# Patient Record
Sex: FEMALE | Race: WHITE | NOT HISPANIC OR LATINO | Employment: UNEMPLOYED | RURAL
[De-identification: names, ages, dates, MRNs, and addresses within clinical notes are randomized per-mention and may not be internally consistent; named-entity substitution may affect disease eponyms.]

---

## 2021-04-19 ENCOUNTER — HOSPITAL ENCOUNTER (OUTPATIENT)
Dept: RADIOLOGY | Facility: HOSPITAL | Age: 76
Discharge: HOME | End: 2021-04-19
Attending: ORTHOPAEDIC SURGERY
Payer: MEDICARE

## 2021-04-19 DIAGNOSIS — M25.511 ACUTE PAIN OF RIGHT SHOULDER: ICD-10-CM

## 2021-04-19 PROBLEM — M12.811 ROTATOR CUFF ARTHROPATHY, RIGHT: Status: ACTIVE | Noted: 2021-04-19

## 2021-04-19 PROCEDURE — 73030 X-RAY EXAM OF SHOULDER: CPT | Mod: TC,RT

## 2021-06-02 ENCOUNTER — HOSPITAL ENCOUNTER (OUTPATIENT)
Dept: RADIOLOGY | Facility: HOSPITAL | Age: 76
Discharge: HOME | End: 2021-06-02
Attending: ORTHOPAEDIC SURGERY
Payer: MEDICARE

## 2021-06-02 DIAGNOSIS — M79.671 ACUTE FOOT PAIN, RIGHT: ICD-10-CM

## 2021-06-02 PROBLEM — M72.2 PLANTAR FASCIITIS OF RIGHT FOOT: Status: ACTIVE | Noted: 2021-06-02

## 2021-06-02 PROCEDURE — 73630 X-RAY EXAM OF FOOT: CPT | Mod: TC,RT

## 2022-08-22 ENCOUNTER — HOSPITAL ENCOUNTER (OUTPATIENT)
Dept: RADIOLOGY | Facility: HOSPITAL | Age: 77
Discharge: HOME | End: 2022-08-22
Attending: ORTHOPAEDIC SURGERY
Payer: MEDICARE

## 2022-08-22 DIAGNOSIS — M25.561 RIGHT KNEE PAIN, UNSPECIFIED CHRONICITY: ICD-10-CM

## 2022-08-22 PROCEDURE — 73562 X-RAY EXAM OF KNEE 3: CPT | Mod: TC,RT

## 2023-06-13 DIAGNOSIS — M25.561 CHRONIC PAIN OF RIGHT KNEE: Primary | ICD-10-CM

## 2023-06-13 DIAGNOSIS — G89.29 CHRONIC PAIN OF RIGHT KNEE: Primary | ICD-10-CM

## 2023-06-13 DIAGNOSIS — M79.671 RIGHT FOOT PAIN: ICD-10-CM

## 2023-06-14 ENCOUNTER — HOSPITAL ENCOUNTER (OUTPATIENT)
Dept: RADIOLOGY | Facility: HOSPITAL | Age: 78
Discharge: HOME | End: 2023-06-14
Attending: NURSE PRACTITIONER
Payer: MEDICARE

## 2023-06-14 ENCOUNTER — OFFICE VISIT (OUTPATIENT)
Dept: ORTHOPEDICS | Facility: CLINIC | Age: 78
End: 2023-06-14
Payer: MEDICARE

## 2023-06-14 VITALS
BODY MASS INDEX: 48.54 KG/M2 | TEMPERATURE: 98 F | HEART RATE: 58 BPM | RESPIRATION RATE: 12 BRPM | HEIGHT: 57 IN | WEIGHT: 225 LBS | SYSTOLIC BLOOD PRESSURE: 122 MMHG | OXYGEN SATURATION: 97 % | DIASTOLIC BLOOD PRESSURE: 76 MMHG

## 2023-06-14 DIAGNOSIS — G89.29 CHRONIC PAIN OF RIGHT KNEE: ICD-10-CM

## 2023-06-14 DIAGNOSIS — M79.671 RIGHT FOOT PAIN: ICD-10-CM

## 2023-06-14 DIAGNOSIS — M25.561 CHRONIC PAIN OF RIGHT KNEE: ICD-10-CM

## 2023-06-14 DIAGNOSIS — M72.2 PLANTAR FASCIITIS: Primary | ICD-10-CM

## 2023-06-14 DIAGNOSIS — M17.11 PRIMARY OSTEOARTHRITIS OF RIGHT KNEE: ICD-10-CM

## 2023-06-14 PROCEDURE — 73564 X-RAY EXAM KNEE 4 OR MORE: CPT | Mod: TC,RT

## 2023-06-14 PROCEDURE — 73564 XR KNEE COMP 4 OR MORE VIEWS RIGHT: ICD-10-PCS | Mod: 26,RT,, | Performed by: RADIOLOGY

## 2023-06-14 PROCEDURE — 73564 X-RAY EXAM KNEE 4 OR MORE: CPT | Mod: 26,RT,, | Performed by: RADIOLOGY

## 2023-06-14 PROCEDURE — 73630 X-RAY EXAM OF FOOT: CPT | Mod: 26,RT,, | Performed by: RADIOLOGY

## 2023-06-14 PROCEDURE — 99214 OFFICE O/P EST MOD 30 MIN: CPT | Mod: PBBFAC | Performed by: NURSE PRACTITIONER

## 2023-06-14 PROCEDURE — 73630 X-RAY EXAM OF FOOT: CPT | Mod: TC,RT

## 2023-06-14 PROCEDURE — 99214 PR OFFICE/OUTPT VISIT, EST, LEVL IV, 30-39 MIN: ICD-10-PCS | Mod: S$PBB,,, | Performed by: NURSE PRACTITIONER

## 2023-06-14 PROCEDURE — 99214 OFFICE O/P EST MOD 30 MIN: CPT | Mod: S$PBB,,, | Performed by: NURSE PRACTITIONER

## 2023-06-14 PROCEDURE — 73630 XR FOOT COMPLETE 3 VIEW RIGHT: ICD-10-PCS | Mod: 26,RT,, | Performed by: RADIOLOGY

## 2023-06-14 NOTE — PROGRESS NOTES
78-year-old female presents ambulatory to orthopedic clinic complaint of pain in her right heel.  She is known to orthopedic clinic being followed for plantar fasciitis of the right heel.  She was last seen 2 at which time she had injection.  She states she had absolutely no relief with the injection.  She did find a night splint that she had been using that was helping.  She was also been using over-the-counter brace for plantar fasciitis.  States she was gotten to the point now where it is very painful to walk.  States she is not interested in any further injections.  She wishes to discuss surgical options with Dr. Ramirez.  Also complained of pain in her right knee that is intermittent.  States that Voltaren beneficial for her knee.    X-ray: X-rays today of the right knee AP lateral patellar reviewed by Dr. Ramirez.  Review of the x-ray of the right knee shows mild to moderate DJD changes.  No evidence acute fracture, dislocation or pathologic bone noted.      X-rays of the right foot AP lateral oblique view shows large calcaneal spur.  There is DJD changes 1st MTP joint.  There is no evidence acute fracture, dislocation or pathologic bone noted.      PE:  She is noted be ambulating with antalgic limp on the right.  Physical exam right knee skin is warm, dry and intact.  No soft tissue swelling noted.  No intra-articular extension further flexion approximately 115°.  There was excellent ligamentous balance instability noted clinically.  Physical exam right foot skin is warm dry and intact.  No soft tissue swelling is noted.  There is point tenderness over the plantar fascia at the calcaneus.  Pedal pulse 2/4.  Capillary refill digits right foot 3 seconds.      Impression:  1.)  Plantar fasciitis-right foot 2.) DJD right knee    Plan:  Safety guidelines activity restrictions cusp patient.  Verbalized understanding.  We will have this patient follow-up Dr. Ramirez discussed treatment options for both her  knee and plantar fasciitis or sooner as needed.

## 2023-06-14 NOTE — PATIENT INSTRUCTIONS
Safety guidelines activity restrictions cusp patient.  Verbalized understanding.  We will have this patient follow-up Dr. Ramirez discussed treatment options for both her knee and plantar fasciitis or sooner as needed

## 2023-06-30 ENCOUNTER — OFFICE VISIT (OUTPATIENT)
Dept: ORTHOPEDICS | Facility: CLINIC | Age: 78
End: 2023-06-30
Payer: MEDICARE

## 2023-06-30 DIAGNOSIS — M72.2 PLANTAR FASCIITIS OF RIGHT FOOT: Primary | ICD-10-CM

## 2023-06-30 PROCEDURE — 20551 PR INJECT TENDON ORIGIN/INSERT: ICD-10-PCS | Mod: S$PBB,RT,, | Performed by: ORTHOPAEDIC SURGERY

## 2023-06-30 PROCEDURE — 99213 OFFICE O/P EST LOW 20 MIN: CPT | Mod: PBBFAC | Performed by: ORTHOPAEDIC SURGERY

## 2023-06-30 PROCEDURE — 99214 PR OFFICE/OUTPT VISIT, EST, LEVL IV, 30-39 MIN: ICD-10-PCS | Mod: S$PBB,25,, | Performed by: ORTHOPAEDIC SURGERY

## 2023-06-30 PROCEDURE — 20551 NJX 1 TENDON ORIGIN/INSJ: CPT | Mod: PBBFAC,RT | Performed by: ORTHOPAEDIC SURGERY

## 2023-06-30 PROCEDURE — 99214 OFFICE O/P EST MOD 30 MIN: CPT | Mod: S$PBB,25,, | Performed by: ORTHOPAEDIC SURGERY

## 2023-06-30 PROCEDURE — 20605 DRAIN/INJ JOINT/BURSA W/O US: CPT | Mod: PBBFAC | Performed by: ORTHOPAEDIC SURGERY

## 2023-06-30 PROCEDURE — 20551 NJX 1 TENDON ORIGIN/INSJ: CPT | Mod: S$PBB,RT,, | Performed by: ORTHOPAEDIC SURGERY

## 2023-06-30 RX ORDER — BUPIVACAINE HYDROCHLORIDE 2.5 MG/ML
1 INJECTION, SOLUTION INFILTRATION; PERINEURAL
Status: COMPLETED | OUTPATIENT
Start: 2023-06-30 | End: 2023-06-30

## 2023-06-30 RX ORDER — TRIAMCINOLONE ACETONIDE 40 MG/ML
40 INJECTION, SUSPENSION INTRA-ARTICULAR; INTRAMUSCULAR
Status: COMPLETED | OUTPATIENT
Start: 2023-06-30 | End: 2023-06-30

## 2023-06-30 RX ADMIN — TRIAMCINOLONE ACETONIDE 40 MG: 40 INJECTION, SUSPENSION INTRA-ARTICULAR; INTRAMUSCULAR at 01:06

## 2023-06-30 RX ADMIN — BUPIVACAINE HYDROCHLORIDE 2.5 MG: 2.5 INJECTION, SOLUTION INFILTRATION; PERINEURAL at 01:06

## 2023-06-30 NOTE — PROGRESS NOTES
CLINIC NOTE       Chief Complaint   Patient presents with    Right Foot - Pain        Desire Worrell is a 78 y.o. female seen today for evaluation of right heel pain.  Symptoms began insidiously 2 years ago.  No history of trauma.  She was diagnosed as having plantar fasciitis.  She had 1 corticosteroid injection in the heel region given by Avtar DUENAS 2 years ago.  She is wearing [employer] shoes.  She does not have to his heel cups.      History reviewed. No pertinent past medical history.  History reviewed. No pertinent family history.  Current Outpatient Medications on File Prior to Visit   Medication Sig Dispense Refill    furosemide (LASIX) 20 MG tablet Take 20 mg by mouth.      levothyroxine (SYNTHROID) 75 MCG tablet Take 75 mcg by mouth.      omeprazole-sodium bicarbonate 20-1.1 mg-gram Cap Take 20 mg by mouth.      potassium chloride (KLOR-CON) 10 MEQ TbSR Take 10 mEq by mouth once daily.       No current facility-administered medications on file prior to visit.       ROS     There were no vitals filed for this visit.    History reviewed. No pertinent surgical history.     Review of patient's allergies indicates:   Allergen Reactions    Prednisone Other (See Comments)     UNKNOWN  C/O Weakness          Ortho Exam :  Well-developed well-nourished  female no acute distress.  Alert oriented cooperative.  Neck is supple without JVD.  Breathing is regular nonlabored.  Skin is warm dry no lesions seen.  Exam of the right foot shows flatfoot with loss of longitudinal arch.  He was moderate clawtoe deformities of the lesser toes.  There is exquisite tenderness palpation over the plantar fascia origin of the os calcis reproducing symptoms.  Ankle ligaments stable clinically.    Radiographic Examination:  Right foot 06/30/2023    Technique:  Three views AP lateral oblique    Findings:  Bones well mineralized.  As loss of longitudinal arch.  Traction spur emanates from the plantar fascia origin of the  os calcis.  Moderate clawtoe deformities and hallux rigidus.    Impression:   See Above    Assessment and Plan  Patient Active Problem List    Diagnosis Date Noted    Primary osteoarthritis of right knee 06/14/2023    Plantar fasciitis 06/02/2021    Rotator cuff arthropathy, right 04/19/2021    Impression:  Plantar fasciitis-right foot   Plan:  The right foot was prepped with Betadine plantar fascia origin of the os calcis injected with triamcinolone and Marcaine 1 cc each.  Two left heel cups issued.  Recheck p.r.n. if symptoms persist or worsen.      Pablo Ramirez M.D.

## 2023-09-22 DIAGNOSIS — M25.512 ACUTE PAIN OF LEFT SHOULDER: Primary | ICD-10-CM

## 2023-09-25 ENCOUNTER — HOSPITAL ENCOUNTER (OUTPATIENT)
Dept: RADIOLOGY | Facility: HOSPITAL | Age: 78
Discharge: HOME | End: 2023-09-25
Attending: NURSE PRACTITIONER
Payer: MEDICARE

## 2023-09-25 ENCOUNTER — OFFICE VISIT (OUTPATIENT)
Dept: ORTHOPEDICS | Facility: CLINIC | Age: 78
End: 2023-09-25
Payer: MEDICARE

## 2023-09-25 VITALS
SYSTOLIC BLOOD PRESSURE: 124 MMHG | WEIGHT: 225 LBS | DIASTOLIC BLOOD PRESSURE: 76 MMHG | RESPIRATION RATE: 16 BRPM | TEMPERATURE: 98 F | BODY MASS INDEX: 48.54 KG/M2 | HEIGHT: 57 IN | OXYGEN SATURATION: 99 % | HEART RATE: 69 BPM

## 2023-09-25 DIAGNOSIS — M75.42 SHOULDER IMPINGEMENT SYNDROME, LEFT: ICD-10-CM

## 2023-09-25 DIAGNOSIS — M25.512 CHRONIC LEFT SHOULDER PAIN: Primary | ICD-10-CM

## 2023-09-25 DIAGNOSIS — M25.512 ACUTE PAIN OF LEFT SHOULDER: ICD-10-CM

## 2023-09-25 DIAGNOSIS — G89.29 CHRONIC LEFT SHOULDER PAIN: Primary | ICD-10-CM

## 2023-09-25 PROCEDURE — 73030 X-RAY EXAM OF SHOULDER: CPT | Mod: 26,LT,, | Performed by: RADIOLOGY

## 2023-09-25 PROCEDURE — 20610 DRAIN/INJ JOINT/BURSA W/O US: CPT | Mod: PBBFAC,LT | Performed by: NURSE PRACTITIONER

## 2023-09-25 PROCEDURE — 99214 OFFICE O/P EST MOD 30 MIN: CPT | Mod: S$PBB,25,, | Performed by: NURSE PRACTITIONER

## 2023-09-25 PROCEDURE — 73030 XR SHOULDER COMPLETE 2 OR MORE VIEWS LEFT: ICD-10-PCS | Mod: 26,LT,, | Performed by: RADIOLOGY

## 2023-09-25 PROCEDURE — 99999PBSHW PR PBB SHADOW TECHNICAL ONLY FILED TO HB: Mod: PBBFAC,,,

## 2023-09-25 PROCEDURE — 99999PBSHW PR PBB SHADOW TECHNICAL ONLY FILED TO HB: ICD-10-PCS | Mod: PBBFAC,,,

## 2023-09-25 PROCEDURE — 20610 LARGE JOINT ASPIRATION/INJECTION: L SUBACROMIAL BURSA: ICD-10-PCS | Mod: S$PBB,LT,, | Performed by: NURSE PRACTITIONER

## 2023-09-25 PROCEDURE — 99214 PR OFFICE/OUTPT VISIT, EST, LEVL IV, 30-39 MIN: ICD-10-PCS | Mod: S$PBB,25,, | Performed by: NURSE PRACTITIONER

## 2023-09-25 PROCEDURE — 99214 OFFICE O/P EST MOD 30 MIN: CPT | Mod: PBBFAC,25 | Performed by: NURSE PRACTITIONER

## 2023-09-25 PROCEDURE — 73030 X-RAY EXAM OF SHOULDER: CPT | Mod: TC,LT

## 2023-09-25 RX ORDER — TRIAMCINOLONE ACETONIDE 40 MG/ML
40 INJECTION, SUSPENSION INTRA-ARTICULAR; INTRAMUSCULAR
Status: DISCONTINUED | OUTPATIENT
Start: 2023-09-25 | End: 2023-09-25 | Stop reason: HOSPADM

## 2023-09-25 RX ORDER — BUPIVACAINE HYDROCHLORIDE 2.5 MG/ML
1 INJECTION, SOLUTION EPIDURAL; INFILTRATION; INTRACAUDAL
Status: DISCONTINUED | OUTPATIENT
Start: 2023-09-25 | End: 2023-09-25 | Stop reason: HOSPADM

## 2023-09-25 RX ADMIN — TRIAMCINOLONE ACETONIDE 40 MG: 40 INJECTION, SUSPENSION INTRA-ARTICULAR; INTRAMUSCULAR at 08:09

## 2023-09-25 RX ADMIN — BUPIVACAINE HYDROCHLORIDE 1 ML: 2.5 INJECTION, SOLUTION EPIDURAL; INFILTRATION; INTRACAUDAL at 08:09

## 2023-09-25 NOTE — PROCEDURES
Large Joint Aspiration/Injection: L subacromial bursa    Date/Time: 9/25/2023 8:30 AM    Performed by: Avtar Cassidy FNP  Authorized by: Avtar Cassidy FNP    Indications:  Pain  Site marked: the procedure site was marked    Timeout: prior to procedure the correct patient, procedure, and site was verified    Local anesthesia used?: No      Details:  Needle Size:  25 G  Ultrasonic Guidance for needle placement?: No    Approach:  Posterior  Location:  Shoulder  Site:  L subacromial bursa  Medications:  1 mL BUPivacaine (PF) 0.25% (2.5 mg/ml) 0.25 % (2.5 mg/mL); 40 mg triamcinolone acetonide 40 mg/mL  Patient tolerance:  Patient tolerated the procedure well with no immediate complications     Left shoulder prepped with Betadine

## 2023-09-25 NOTE — PATIENT INSTRUCTIONS
Safety guidelines and activity restrictions are discussed with the patient.  She verbalizes understanding.  We will schedule MRI examination left shoulder have return orthopedic clinic with results.  May continue use a leave per label instructions.  May return orthopedic clinic sooner as needed.

## 2023-09-25 NOTE — PROGRESS NOTES
78-year-old female presents ambulatory orthopedic clinic valuation of pain in her non dominant left shoulder.  She relates a history that on July 4, 2023 she fell face forward.  She began to have mild discomfort in her left shoulder that increased over the next 2 weeks.  It is gotten to a point now where it is excruciating.  She does have a history of a previous left shoulder arthroscopy.  States she did have a tear in her rotator cuff at that time.  States it was repaired by Dr. Ramirez.  Also relates a history of previous rotator cuff repair of the right shoulder in the remote past by Dr. Ramirez.  She was used over-the-counter leave without much relief of symptoms.  States she has pain with attempted motion of her left shoulder.      X-ray: X-rays today left shoulder AP, lateral and Y scapular view shows no evidence of acute fracture, dislocation or pathologic bone.  AC joint osteophyte noted.      PE:  Patient is noted be ambulating independent no perceptible limp.  Physical exam of her non dominant left shoulder shows the shoulder have normal contour.  Passive range of motion left shoulder 90° abduction however this is very painful for the patient.  Hernandez-Syd test reproduces pain symptoms.  Neer test is mildly positive upon raising but significantly positive when lowering the arm.  Empty can test is positive for both weakness and discomfort.  Active range motion left shoulder approximately 70° abduction independent scapulothoracic motion.  She is unable to get her hand to the side of her head.  She is able to internally rotate small of her back.  Left radial pulse 2/4.  Capillary refill all digits left hand less than 3 seconds.      Impression:  Impingement syndrome shoulder-left     Plan:  Safety guidelines and activity restrictions are discussed with the patient.  She verbalizes understanding.  She is offered subacromial steroid injection left shoulder wished proceed.  Left shoulder prepped with  Betadine.  Subacromial triamcinolone 40 mg and 0.25% bupivacaine 1 cc instilled without difficulty.  We will schedule MRI examination left shoulder have return orthopedic clinic with results.  May continue use a leave per label instructions.  May return orthopedic clinic sooner as needed.

## 2023-09-26 ENCOUNTER — HOSPITAL ENCOUNTER (OUTPATIENT)
Dept: RADIOLOGY | Facility: HOSPITAL | Age: 78
Discharge: HOME | End: 2023-09-26
Attending: NURSE PRACTITIONER
Payer: MEDICARE

## 2023-09-26 DIAGNOSIS — G89.29 CHRONIC LEFT SHOULDER PAIN: ICD-10-CM

## 2023-09-26 DIAGNOSIS — M25.512 CHRONIC LEFT SHOULDER PAIN: ICD-10-CM

## 2023-09-26 PROCEDURE — 73221 MRI SHOULDER WITHOUT CONTRAST LEFT: ICD-10-PCS | Mod: 26,LT,, | Performed by: STUDENT IN AN ORGANIZED HEALTH CARE EDUCATION/TRAINING PROGRAM

## 2023-09-26 PROCEDURE — 73221 MRI JOINT UPR EXTREM W/O DYE: CPT | Mod: TC,LT

## 2023-09-26 PROCEDURE — 73221 MRI JOINT UPR EXTREM W/O DYE: CPT | Mod: 26,LT,, | Performed by: STUDENT IN AN ORGANIZED HEALTH CARE EDUCATION/TRAINING PROGRAM

## 2023-09-27 ENCOUNTER — TELEPHONE (OUTPATIENT)
Dept: ORTHOPEDICS | Facility: CLINIC | Age: 78
End: 2023-09-27
Payer: MEDICARE

## 2023-09-27 NOTE — TELEPHONE ENCOUNTER
----- Message from Gianna Almendarez sent at 9/27/2023 10:29 AM CDT -----  Regarding: Result  Patient is calling for MRI results. Please call patient at [phone]. Thanks!

## 2023-09-27 NOTE — TELEPHONE ENCOUNTER
9/27/23 called and spoke with pt. DAP has reviewed mri results and wants to see pt in clinic pt agrees appt made

## 2023-10-02 ENCOUNTER — OFFICE VISIT (OUTPATIENT)
Dept: ORTHOPEDICS | Facility: CLINIC | Age: 78
End: 2023-10-02
Payer: MEDICARE

## 2023-10-02 DIAGNOSIS — M25.512 ACUTE PAIN OF LEFT SHOULDER: Primary | ICD-10-CM

## 2023-10-02 DIAGNOSIS — M75.42 SHOULDER IMPINGEMENT SYNDROME, LEFT: ICD-10-CM

## 2023-10-02 PROCEDURE — 99214 PR OFFICE/OUTPT VISIT, EST, LEVL IV, 30-39 MIN: ICD-10-PCS | Mod: S$PBB,,, | Performed by: ORTHOPAEDIC SURGERY

## 2023-10-02 PROCEDURE — 99214 OFFICE O/P EST MOD 30 MIN: CPT | Mod: S$PBB,,, | Performed by: ORTHOPAEDIC SURGERY

## 2023-10-02 PROCEDURE — 99213 OFFICE O/P EST LOW 20 MIN: CPT | Mod: PBBFAC | Performed by: ORTHOPAEDIC SURGERY

## 2023-10-02 NOTE — PROGRESS NOTES
CLINIC NOTE       Chief Complaint   Patient presents with    Results        Desire Worrell is a 78 y.o. female seen today for evaluation of left shoulder injury/pain.  She reportedly fell while leaving a restaurant on July 4th of this year.  Attempted to break her fall on her outstretched left nondominant hand.  She is had persistent left shoulder pain.  Her pain restrict her abilities to abduct her arm.  She saw Avtar DUENAS and had a subacromial steroid injection without relief of symptoms.  She had right shoulder arthroscopy proximally 5 years ago in left shoulder arthroscopy 20 years ago X-rays of the right shoulder 09/25/2023 shows the glenohumeral joint to be located.  There is some irregularity involving the greater tuberosity region of the right shoulder but no evidence of acute fracture, dislocation or pathologic bone.  An MRI examination of the left shoulder was conducted on 09/26/2023 joint partial-thickness tearing of the supraspinatus and infraspinatus tendons.  Partial-thickness tearing of the subscapularis tendon.  There was full-thickness cartilage thinning of the glenohumeral joint.  Appears to be a vertical split tear involving the long head biceps tendon  No past medical history on file.  No family history on file.  Current Outpatient Medications on File Prior to Visit   Medication Sig Dispense Refill    furosemide (LASIX) 20 MG tablet Take 20 mg by mouth.      levothyroxine (SYNTHROID) 75 MCG tablet Take 75 mcg by mouth.      omeprazole-sodium bicarbonate 20-1.1 mg-gram Cap Take 20 mg by mouth.      potassium chloride (KLOR-CON) 10 MEQ TbSR Take 10 mEq by mouth once daily.       No current facility-administered medications on file prior to visit.       ROS     There were no vitals filed for this visit.    No past surgical history on file.     Review of patient's allergies indicates:   Allergen Reactions    Prednisone Other (See Comments)     UNKNOWN  C/O Weakness          Ortho Exam:   Well-developed well-nourished white female no acute distress.  She is alert oriented cooperative.  Neck is supple without JVD.  Breathing is regular nonlabored.  Skin is warm and dry no lesions seen.  Exam left shoulder is normal contour.  She resists active and passive shoulder abduction beyond 70°.  No crepitance or instability signs appreciated.    Radiographic Examination:    Technique:    Findings:    Impression:   See Above    Assessment and Plan  Patient Active Problem List    Diagnosis Date Noted    Chronic left shoulder pain 09/25/2023    Shoulder impingement syndrome, left 09/25/2023    Primary osteoarthritis of right knee 06/14/2023    Plantar fasciitis of right foot 06/02/2021    Rotator cuff arthropathy, right 04/19/2021    Impression:  Left shoulder impingement/mild-to-moderate glenohumeral joint DJD.  Plan: Patient offered left shoulder arthroscopic subacromial decompression and debridement.  Potential benefits risks surgery outlined to include but not limited to bleeding, infection, damage to blood vessels nerves, need for further surgery, other risks complications including even death the patient wished to proceed  Pablo Ramirez M.D.

## 2023-10-11 NOTE — SUBJECTIVE & OBJECTIVE
History reviewed. No pertinent past medical history.    History reviewed. No pertinent surgical history.    Review of patient's allergies indicates:   Allergen Reactions    Prednisone Other (See Comments)     UNKNOWN  C/O Weakness         No current facility-administered medications for this encounter.     Current Outpatient Medications   Medication Sig    furosemide (LASIX) 20 MG tablet Take 20 mg by mouth.    levothyroxine (SYNTHROID) 75 MCG tablet Take 75 mcg by mouth.    omeprazole-sodium bicarbonate 20-1.1 mg-gram Cap Take 20 mg by mouth.    potassium chloride (KLOR-CON) 10 MEQ TbSR Take 10 mEq by mouth once daily.     Family History    None       Tobacco Use    Smoking status: Never    Smokeless tobacco: Never   Substance and Sexual Activity    Alcohol use: Not Currently    Drug use: Not Currently    Sexual activity: Not on file     Review of Systems   Constitutional: Negative.     Objective:     Vital Signs (Most Recent):    Vital Signs (24h Range):  BP: ()/()   Arterial Line BP: ()/()            There is no height or weight on file to calculate BMI.    No intake or output data in the 24 hours ending 10/11/23 1810     General    Vitals reviewed.  Constitutional: She is oriented to person, place, and time. She appears well-developed and well-nourished.   HENT:   Head: Normocephalic and atraumatic.   Eyes: EOM are normal. Pupils are equal, round, and reactive to light.   Neck: Neck supple.   Cardiovascular:  Normal rate, regular rhythm and normal heart sounds.            Pulmonary/Chest: Effort normal and breath sounds normal.   Abdominal: Soft. Bowel sounds are normal.   Neurological: She is alert and oriented to person, place, and time.   Psychiatric: She has a normal mood and affect. Her behavior is normal.         Right Shoulder Exam   Right shoulder exam is normal.    Left Shoulder Exam     Tenderness   The patient is tender to palpation of the acromioclavicular joint, acromion and greater  "tuberosity.    Range of Motion   Active abduction:  normal   Passive abduction:  normal   Extension:  normal   Forward Flexion:  normal   Adduction: normal    Muscle Strength   The patient has normal left shoulder strength.    Tests & Signs   Cross arm: positive  Impingement: positive  Rotator Cuff Painful Arc/Range: moderate    Other   Sensation: normal       Vascular Exam       Left Pulses      Radial:                    2+      Capillary Refill  Left Hand: normal capillary refill           Significant Labs: Troponin: No results for input(s): "TROPONINI" in the last 48 hours.  All pertinent labs within the past 24 hours have been reviewed.    Significant Imaging: I have reviewed all pertinent imaging results/findings.  "

## 2023-10-11 NOTE — H&P
[facility]  Orthopedics  H&P    Patient Name: Desire Worrell  MRN: [id number]  Admission Date: (Not on file)  Primary Care Provider: Hilario Thomas DO    Patient information was obtained from patient and ER records.     Subjective:     Principal Problem:Shoulder impingement syndrome, left    Chief Complaint: No chief complaint on file.       HPI: Chief complaint:  Left shoulder pain   History:   Desire Worrell is a 78 y.o. female seen today evaluation of left shoulder injury/pain.  She reportedly fell while leaving a restaurant on July 4th of this year.  Attempted to break her fall on her outstretched left nondominant hand.  She is had persistent left shoulder pain.  Her pain restrict her abilities to abduct her arm.  She saw Avtar DUENAS and had a subacromial steroid injection without relief of symptoms.  She had right shoulder arthroscopy proximally 5 years ago in left shoulder arthroscopy 20 years ago X-rays of the right shoulder 09/25/2023 shows the glenohumeral joint to be located.  There is some irregularity involving the greater tuberosity region of the right shoulder but no evidence of acute fracture, dislocation or pathologic bone.  An MRI examination of the left shoulder was conducted on 09/26/2023 joint partial-thickness tearing of the supraspinatus and infraspinatus tendons.  Partial-thickness tearing of the subscapularis tendon.  There was full-thickness cartilage thinning of the glenohumeral joint.  Appears to be a vertical split tear involving the long head biceps tendon  Impression: Impingement syndrome left shoulder   Plan:  Left shoulder arthroscopic subacromial decompression.  Outpatient general/regional block anesthesia.  Potential benefits and risks of surgery outlined to include but not limited to bleeding, infection, damage to blood vessels and nerves, need for further surgery, other risks and complications including even death the patient wished to  proceed.      History reviewed. No pertinent past medical history.    History reviewed. No pertinent surgical history.    Review of patient's allergies indicates:   Allergen Reactions    Prednisone Other (See Comments)     UNKNOWN  C/O Weakness         No current facility-administered medications for this encounter.     Current Outpatient Medications   Medication Sig    furosemide (LASIX) 20 MG tablet Take 20 mg by mouth.    levothyroxine (SYNTHROID) 75 MCG tablet Take 75 mcg by mouth.    omeprazole-sodium bicarbonate 20-1.1 mg-gram Cap Take 20 mg by mouth.    potassium chloride (KLOR-CON) 10 MEQ TbSR Take 10 mEq by mouth once daily.     Family History    None       Tobacco Use    Smoking status: Never    Smokeless tobacco: Never   Substance and Sexual Activity    Alcohol use: Not Currently    Drug use: Not Currently    Sexual activity: Not on file     Review of Systems   Constitutional: Negative.     Objective:     Vital Signs (Most Recent):    Vital Signs (24h Range):  BP: ()/()   Arterial Line BP: ()/()            There is no height or weight on file to calculate BMI.    No intake or output data in the 24 hours ending 10/11/23 1810     General    Vitals reviewed.  Constitutional: She is oriented to person, place, and time. She appears well-developed and well-nourished.   HENT:   Head: Normocephalic and atraumatic.   Eyes: EOM are normal. Pupils are equal, round, and reactive to light.   Neck: Neck supple.   Cardiovascular:  Normal rate, regular rhythm and normal heart sounds.            Pulmonary/Chest: Effort normal and breath sounds normal.   Abdominal: Soft. Bowel sounds are normal.   Neurological: She is alert and oriented to person, place, and time.   Psychiatric: She has a normal mood and affect. Her behavior is normal.         Right Shoulder Exam   Right shoulder exam is normal.    Left Shoulder Exam     Tenderness   The patient is tender to palpation of the acromioclavicular joint, acromion and  "greater tuberosity.    Range of Motion   Active abduction:  normal   Passive abduction:  normal   Extension:  normal   Forward Flexion:  normal   Adduction: normal    Muscle Strength   The patient has normal left shoulder strength.    Tests & Signs   Cross arm: positive  Impingement: positive  Rotator Cuff Painful Arc/Range: moderate    Other   Sensation: normal       Vascular Exam       Left Pulses      Radial:                    2+      Capillary Refill  Left Hand: normal capillary refill           Significant Labs: Troponin: No results for input(s): "TROPONINI" in the last 48 hours.  All pertinent labs within the past 24 hours have been reviewed.    Significant Imaging: I have reviewed all pertinent imaging results/findings.    Assessment/Plan:     No notes have been filed under this hospital service.  Service: Orthopedic Surgery      Pablo Ramirez MD  Orthopedics  [facility] - Orthopedic Periop Services    "

## 2023-10-11 NOTE — HPI
Chief complaint:  Left shoulder pain   History:   Desire Worrell is a 78 y.o. female seen today evaluation of left shoulder injury/pain.  She reportedly fell while leaving a restaurant on July 4th of this year.  Attempted to break her fall on her outstretched left nondominant hand.  She is had persistent left shoulder pain.  Her pain restrict her abilities to abduct her arm.  She saw Avtar DUENAS and had a subacromial steroid injection without relief of symptoms.  She had right shoulder arthroscopy proximally 5 years ago in left shoulder arthroscopy 20 years ago X-rays of the right shoulder 09/25/2023 shows the glenohumeral joint to be located.  There is some irregularity involving the greater tuberosity region of the right shoulder but no evidence of acute fracture, dislocation or pathologic bone.  An MRI examination of the left shoulder was conducted on 09/26/2023 joint partial-thickness tearing of the supraspinatus and infraspinatus tendons.  Partial-thickness tearing of the subscapularis tendon.  There was full-thickness cartilage thinning of the glenohumeral joint.  Appears to be a vertical split tear involving the long head biceps tendon  Impression: Impingement syndrome left shoulder   Plan:  Left shoulder arthroscopic subacromial decompression.  Outpatient general/regional block anesthesia.  Potential benefits and risks of surgery outlined to include but not limited to bleeding, infection, damage to blood vessels and nerves, need for further surgery, other risks and complications including even death the patient wished to proceed.

## 2023-10-12 ENCOUNTER — HOSPITAL ENCOUNTER (OUTPATIENT)
Facility: HOSPITAL | Age: 78
Discharge: HOME | End: 2023-10-12
Attending: ORTHOPAEDIC SURGERY | Admitting: ORTHOPAEDIC SURGERY
Payer: MEDICARE

## 2023-10-12 ENCOUNTER — ANESTHESIA (OUTPATIENT)
Dept: SURGERY | Facility: HOSPITAL | Age: 78
End: 2023-10-12
Payer: MEDICARE

## 2023-10-12 ENCOUNTER — ANESTHESIA EVENT (OUTPATIENT)
Dept: SURGERY | Facility: HOSPITAL | Age: 78
End: 2023-10-12
Payer: MEDICARE

## 2023-10-12 VITALS
WEIGHT: 215 LBS | SYSTOLIC BLOOD PRESSURE: 150 MMHG | HEART RATE: 67 BPM | OXYGEN SATURATION: 94 % | TEMPERATURE: 98 F | HEIGHT: 57 IN | BODY MASS INDEX: 46.38 KG/M2 | DIASTOLIC BLOOD PRESSURE: 67 MMHG | RESPIRATION RATE: 14 BRPM

## 2023-10-12 DIAGNOSIS — M75.42 SHOULDER IMPINGEMENT SYNDROME, LEFT: Primary | ICD-10-CM

## 2023-10-12 LAB
GLUCOSE SERPL-MCNC: 101 MG/DL (ref 70–105)
GLUCOSE SERPL-MCNC: 88 MG/DL (ref 70–105)

## 2023-10-12 PROCEDURE — 29824 PR SHLDR ARTHROSCOP,SURG,DIS CLAVICULECTOMY: ICD-10-PCS | Mod: 51,LT,, | Performed by: ORTHOPAEDIC SURGERY

## 2023-10-12 PROCEDURE — 71000015 HC POSTOP RECOV 1ST HR: Performed by: ORTHOPAEDIC SURGERY

## 2023-10-12 PROCEDURE — 25000003 PHARM REV CODE 250: Performed by: ORTHOPAEDIC SURGERY

## 2023-10-12 PROCEDURE — D9220A PRA ANESTHESIA: Mod: ANES,,, | Performed by: ANESTHESIOLOGY

## 2023-10-12 PROCEDURE — 29826 SHO ARTHRS SRG DECOMPRESSION: CPT | Mod: LT,,, | Performed by: ORTHOPAEDIC SURGERY

## 2023-10-12 PROCEDURE — 29826 PR SHLDR ARTHROSCOP,PART ACROMIOPLAS: ICD-10-PCS | Mod: LT,,, | Performed by: ORTHOPAEDIC SURGERY

## 2023-10-12 PROCEDURE — 36000710: Performed by: ORTHOPAEDIC SURGERY

## 2023-10-12 PROCEDURE — 71000033 HC RECOVERY, INTIAL HOUR: Performed by: ORTHOPAEDIC SURGERY

## 2023-10-12 PROCEDURE — 27201423 OPTIME MED/SURG SUP & DEVICES STERILE SUPPLY: Performed by: ORTHOPAEDIC SURGERY

## 2023-10-12 PROCEDURE — 29827 SHO ARTHRS SRG RT8TR CUF RPR: CPT | Mod: LT,,, | Performed by: ORTHOPAEDIC SURGERY

## 2023-10-12 PROCEDURE — 63600175 PHARM REV CODE 636 W HCPCS: Performed by: NURSE ANESTHETIST, CERTIFIED REGISTERED

## 2023-10-12 PROCEDURE — 29824 SHO ARTHRS SRG DSTL CLAVICLC: CPT | Mod: 51,LT,, | Performed by: ORTHOPAEDIC SURGERY

## 2023-10-12 PROCEDURE — D9220A PRA ANESTHESIA: Mod: CRNA,,, | Performed by: NURSE ANESTHETIST, CERTIFIED REGISTERED

## 2023-10-12 PROCEDURE — 37000008 HC ANESTHESIA 1ST 15 MINUTES: Performed by: ORTHOPAEDIC SURGERY

## 2023-10-12 PROCEDURE — 25000003 PHARM REV CODE 250: Performed by: NURSE ANESTHETIST, CERTIFIED REGISTERED

## 2023-10-12 PROCEDURE — 36000711: Performed by: ORTHOPAEDIC SURGERY

## 2023-10-12 PROCEDURE — D9220A PRA ANESTHESIA: ICD-10-PCS | Mod: ANES,,, | Performed by: ANESTHESIOLOGY

## 2023-10-12 PROCEDURE — 29827 PR SHLDR ARTHROSCOP,SURG,W/ROTAT CUFF REPR: ICD-10-PCS | Mod: LT,,, | Performed by: ORTHOPAEDIC SURGERY

## 2023-10-12 PROCEDURE — 25000003 PHARM REV CODE 250: Performed by: ANESTHESIOLOGY

## 2023-10-12 PROCEDURE — 63600175 PHARM REV CODE 636 W HCPCS: Performed by: ORTHOPAEDIC SURGERY

## 2023-10-12 PROCEDURE — 82962 GLUCOSE BLOOD TEST: CPT

## 2023-10-12 PROCEDURE — 71000016 HC POSTOP RECOV ADDL HR: Performed by: ORTHOPAEDIC SURGERY

## 2023-10-12 PROCEDURE — C1713 ANCHOR/SCREW BN/BN,TIS/BN: HCPCS | Performed by: ORTHOPAEDIC SURGERY

## 2023-10-12 PROCEDURE — 37000009 HC ANESTHESIA EA ADD 15 MINS: Performed by: ORTHOPAEDIC SURGERY

## 2023-10-12 PROCEDURE — D9220A PRA ANESTHESIA: ICD-10-PCS | Mod: CRNA,,, | Performed by: NURSE ANESTHETIST, CERTIFIED REGISTERED

## 2023-10-12 PROCEDURE — 63600175 PHARM REV CODE 636 W HCPCS: Performed by: ANESTHESIOLOGY

## 2023-10-12 DEVICE — 4.75MM BC KNOTLESS SWIVELOCK
Type: IMPLANTABLE DEVICE | Site: SHOULDER | Status: FUNCTIONAL
Brand: ARTHREX®

## 2023-10-12 DEVICE — BIO-COMP, SWIVELOCK C, TT, 4.75X19.1MM
Type: IMPLANTABLE DEVICE | Site: SHOULDER | Status: FUNCTIONAL
Brand: ARTHREX®

## 2023-10-12 DEVICE — BIO-COMP, SWIVELOCK C, FT, 4.75X19.1MM
Type: IMPLANTABLE DEVICE | Site: SHOULDER | Status: FUNCTIONAL
Brand: ARTHREX®

## 2023-10-12 DEVICE — ANCHOR SUT BIOCOM 5.5X19.1MM: Type: IMPLANTABLE DEVICE | Site: SHOULDER | Status: FUNCTIONAL

## 2023-10-12 RX ORDER — DIPHENHYDRAMINE HYDROCHLORIDE 50 MG/ML
25 INJECTION INTRAMUSCULAR; INTRAVENOUS EVERY 6 HOURS PRN
Status: DISCONTINUED | OUTPATIENT
Start: 2023-10-12 | End: 2023-10-12 | Stop reason: HOSPADM

## 2023-10-12 RX ORDER — ROPIVACAINE HYDROCHLORIDE 7.5 MG/ML
INJECTION, SOLUTION EPIDURAL; PERINEURAL
Status: COMPLETED | OUTPATIENT
Start: 2023-10-12 | End: 2023-10-12

## 2023-10-12 RX ORDER — HYDROCODONE BITARTRATE AND ACETAMINOPHEN 5; 325 MG/1; MG/1
1 TABLET ORAL EVERY 4 HOURS PRN
Status: CANCELLED | OUTPATIENT
Start: 2023-10-12

## 2023-10-12 RX ORDER — ROCURONIUM BROMIDE 10 MG/ML
INJECTION, SOLUTION INTRAVENOUS
Status: DISCONTINUED | OUTPATIENT
Start: 2023-10-12 | End: 2023-10-12

## 2023-10-12 RX ORDER — LIDOCAINE HYDROCHLORIDE 20 MG/ML
INJECTION, SOLUTION EPIDURAL; INFILTRATION; INTRACAUDAL; PERINEURAL
Status: DISCONTINUED | OUTPATIENT
Start: 2023-10-12 | End: 2023-10-12

## 2023-10-12 RX ORDER — PHENYLEPHRINE HYDROCHLORIDE 10 MG/ML
INJECTION INTRAVENOUS
Status: DISCONTINUED | OUTPATIENT
Start: 2023-10-12 | End: 2023-10-12

## 2023-10-12 RX ORDER — HYDROMORPHONE HYDROCHLORIDE 2 MG/ML
0.5 INJECTION, SOLUTION INTRAMUSCULAR; INTRAVENOUS; SUBCUTANEOUS EVERY 5 MIN PRN
Status: DISCONTINUED | OUTPATIENT
Start: 2023-10-12 | End: 2023-10-12 | Stop reason: HOSPADM

## 2023-10-12 RX ORDER — ONDANSETRON 4 MG/1
8 TABLET, ORALLY DISINTEGRATING ORAL EVERY 8 HOURS PRN
Status: CANCELLED | OUTPATIENT
Start: 2023-10-12

## 2023-10-12 RX ORDER — ONDANSETRON 2 MG/ML
INJECTION INTRAMUSCULAR; INTRAVENOUS
Status: DISCONTINUED | OUTPATIENT
Start: 2023-10-12 | End: 2023-10-12

## 2023-10-12 RX ORDER — MEPERIDINE HYDROCHLORIDE 25 MG/ML
25 INJECTION INTRAMUSCULAR; INTRAVENOUS; SUBCUTANEOUS EVERY 10 MIN PRN
Status: DISCONTINUED | OUTPATIENT
Start: 2023-10-12 | End: 2023-10-12 | Stop reason: HOSPADM

## 2023-10-12 RX ORDER — ONDANSETRON 2 MG/ML
4 INJECTION INTRAMUSCULAR; INTRAVENOUS DAILY PRN
Status: DISCONTINUED | OUTPATIENT
Start: 2023-10-12 | End: 2023-10-12 | Stop reason: HOSPADM

## 2023-10-12 RX ORDER — HYDROCODONE BITARTRATE AND ACETAMINOPHEN 10; 325 MG/1; MG/1
1 TABLET ORAL EVERY 4 HOURS PRN
Status: CANCELLED | OUTPATIENT
Start: 2023-10-12

## 2023-10-12 RX ORDER — HYDROCODONE BITARTRATE AND ACETAMINOPHEN 5; 325 MG/1; MG/1
1 TABLET ORAL EVERY 6 HOURS PRN
Qty: 28 TABLET | Refills: 0 | Status: SHIPPED | OUTPATIENT
Start: 2023-10-12 | End: 2023-10-19

## 2023-10-12 RX ORDER — LIDOCAINE HYDROCHLORIDE 40 MG/ML
SOLUTION TOPICAL
Status: DISCONTINUED | OUTPATIENT
Start: 2023-10-12 | End: 2023-10-12

## 2023-10-12 RX ORDER — PROMETHAZINE HYDROCHLORIDE 25 MG/1
25 TABLET ORAL EVERY 6 HOURS PRN
Status: CANCELLED | OUTPATIENT
Start: 2023-10-12

## 2023-10-12 RX ORDER — FENTANYL CITRATE 50 UG/ML
INJECTION, SOLUTION INTRAMUSCULAR; INTRAVENOUS
Status: DISCONTINUED | OUTPATIENT
Start: 2023-10-12 | End: 2023-10-12

## 2023-10-12 RX ORDER — ACETAMINOPHEN 500 MG
1000 TABLET ORAL ONCE
Status: COMPLETED | OUTPATIENT
Start: 2023-10-12 | End: 2023-10-12

## 2023-10-12 RX ORDER — EPINEPHRINE 1 MG/ML
INJECTION INTRAMUSCULAR; INTRAVENOUS; SUBCUTANEOUS
Status: DISCONTINUED | OUTPATIENT
Start: 2023-10-12 | End: 2023-10-12 | Stop reason: HOSPADM

## 2023-10-12 RX ORDER — SODIUM CHLORIDE 9 MG/ML
INJECTION, SOLUTION INTRAVENOUS CONTINUOUS
Status: DISCONTINUED | OUTPATIENT
Start: 2023-10-12 | End: 2023-10-12 | Stop reason: HOSPADM

## 2023-10-12 RX ORDER — PROPOFOL 10 MG/ML
VIAL (ML) INTRAVENOUS
Status: DISCONTINUED | OUTPATIENT
Start: 2023-10-12 | End: 2023-10-12

## 2023-10-12 RX ORDER — MORPHINE SULFATE 10 MG/ML
4 INJECTION INTRAMUSCULAR; INTRAVENOUS; SUBCUTANEOUS EVERY 5 MIN PRN
Status: DISCONTINUED | OUTPATIENT
Start: 2023-10-12 | End: 2023-10-12 | Stop reason: HOSPADM

## 2023-10-12 RX ORDER — ACETAMINOPHEN 500 MG
1000 TABLET ORAL EVERY 6 HOURS PRN
Status: CANCELLED | OUTPATIENT
Start: 2023-10-12

## 2023-10-12 RX ORDER — BUPIVACAINE HYDROCHLORIDE 2.5 MG/ML
INJECTION, SOLUTION EPIDURAL; INFILTRATION; INTRACAUDAL
Status: DISCONTINUED | OUTPATIENT
Start: 2023-10-12 | End: 2023-10-12 | Stop reason: HOSPADM

## 2023-10-12 RX ORDER — CEFAZOLIN SODIUM 1 G/3ML
INJECTION, POWDER, FOR SOLUTION INTRAMUSCULAR; INTRAVENOUS
Status: DISCONTINUED | OUTPATIENT
Start: 2023-10-12 | End: 2023-10-12

## 2023-10-12 RX ADMIN — SUGAMMADEX 200 MG: 100 INJECTION, SOLUTION INTRAVENOUS at 02:10

## 2023-10-12 RX ADMIN — PROPOFOL 100 MG: 10 INJECTION, EMULSION INTRAVENOUS at 11:10

## 2023-10-12 RX ADMIN — LIDOCAINE HYDROCHLORIDE 120 MG: 40 SOLUTION TOPICAL at 11:10

## 2023-10-12 RX ADMIN — SODIUM CHLORIDE: 9 INJECTION, SOLUTION INTRAVENOUS at 10:10

## 2023-10-12 RX ADMIN — PHENYLEPHRINE HYDROCHLORIDE 200 MCG: 10 INJECTION INTRAVENOUS at 01:10

## 2023-10-12 RX ADMIN — FENTANYL CITRATE 100 MCG: 50 INJECTION INTRAMUSCULAR; INTRAVENOUS at 11:10

## 2023-10-12 RX ADMIN — CEFAZOLIN 2 G: 1 INJECTION, POWDER, FOR SOLUTION INTRAMUSCULAR; INTRAVENOUS; PARENTERAL at 11:10

## 2023-10-12 RX ADMIN — ACETAMINOPHEN 1000 MG: 500 TABLET ORAL at 11:10

## 2023-10-12 RX ADMIN — PHENYLEPHRINE HYDROCHLORIDE 200 MCG: 10 INJECTION INTRAVENOUS at 02:10

## 2023-10-12 RX ADMIN — ONDANSETRON 4 MG: 2 INJECTION INTRAMUSCULAR; INTRAVENOUS at 12:10

## 2023-10-12 RX ADMIN — ACETAMINOPHEN 1000 MG: 500 TABLET ORAL at 12:10

## 2023-10-12 RX ADMIN — ROPIVACAINE HYDROCHLORIDE 35 ML: 7.5 INJECTION, SOLUTION EPIDURAL; PERINEURAL at 12:10

## 2023-10-12 RX ADMIN — ROCURONIUM BROMIDE 40 MG: 10 INJECTION, SOLUTION INTRAVENOUS at 11:10

## 2023-10-12 RX ADMIN — LIDOCAINE HYDROCHLORIDE 80 MG: 20 INJECTION, SOLUTION INTRAVENOUS at 11:10

## 2023-10-12 NOTE — INTERVAL H&P NOTE
The patient has been examined and the H&P has been reviewed:    I concur with the findings and no changes have occurred since H&P was written.    Surgery risks, benefits and alternative options discussed and understood by patient/family.          Active Hospital Problems    Diagnosis  POA    *Shoulder impingement syndrome, left [M75.42]  Yes      Resolved Hospital Problems   No resolved problems to display.

## 2023-10-12 NOTE — ANESTHESIA PROCEDURE NOTES
Peripheral Block    Patient location during procedure: OR    Reason for block: primary anesthetic    Diagnosis: Left shoulder DJD   Start time: 10/12/2023 12:06 PM  Timeout: 10/12/2023 12:04 PM   End time: 10/12/2023 12:11 PM    Staffing  Authorizing Provider: Connor Powers MD  Performing Provider: Connor Powers MD    Staffing  Performed by: Connor Powers MD  Authorized by: Connor Powers MD    Preanesthetic Checklist  Completed: patient identified, risks and benefits discussed, pre-op evaluation and timeout performed  Peripheral Block  Patient position: supine  Prep: ChloraPrep  Block type: interscalene  Laterality: left  Injection technique: single shot  Needle  Needle localization: nerve stimulator     Assessment  Injection assessment: negative aspiration    Medications:    Medications: ROPIvacaine (NAROPIN) injection 0.75% - Perineural   35 mL - 10/12/2023 12:11:00 PM    Additional Notes  No complications.

## 2023-10-12 NOTE — BRIEF OP NOTE
[facility] - Orthopedic Periop Services  Brief Operative Note    Surgery Date: 10/12/2023     Surgeon(s) and Role:     * Pablo Ramirez MD - Primary    Assisting Surgeon: None    Pre-op Diagnosis:  Acute pain of left shoulder [M25.512]  Shoulder impingement syndrome, left [M75.42]    Post-op Diagnosis:  Post-Op Diagnosis Codes:     * Acute pain of left shoulder [M25.512]     * Shoulder impingement syndrome, left [M75.42]    Procedure(s) (LRB):  ARTHROSCOPY, SHOULDER (Left)  ACROMIOPLASTY, ARTHROSCOPIC (Left)  EXCISION, CLAVICLE, DISTAL (Left)  DEBRIDEMENT, SHOULDER, ARTHROSCOPIC (Left)  REPAIR, ROTATOR CUFF, ARTHROSCOPIC (Left)    Anesthesia:  General/block    Description of the findings of the procedure(s): See Op Note     Estimated Blood Loss: * No values recorded between 10/12/2023 12:38 PM and 10/12/2023  2:27 PM * 10 cc         Specimens:   Specimen (24h ago, onward)      None              Discharge Note    OUTCOME: Patient tolerated treatment/procedure well without complication and is now ready for discharge.    DISPOSITION: Home or Self Care    FINAL DIAGNOSIS:  Shoulder impingement syndrome, left    FOLLOWUP: In clinic    DISCHARGE INSTRUCTIONS:    Discharge Procedure Orders   Diet general     Keep surgical extremity elevated     Ice to affected area   Order Comments: using barrier between ice and skin (specify duration&frequency)     Change dressing (specify)   Order Comments: Dressing change: one time per day beginning 72 hours post op.     Call MD for:  temperature >100.4     Call MD for:  persistent nausea and vomiting     Call MD for:  severe uncontrolled pain     Call MD for:  difficulty breathing, headache or visual disturbances     Call MD for:  redness, tenderness, or signs of infection (pain, swelling, redness, odor or green/yellow discharge around incision site)     Call MD for:  hives     Call MD for:  persistent dizziness or light-headedness     Call MD for:  extreme fatigue      Leave dressing on - Keep it clean, dry, and intact until clinic visit     Activity as tolerated     Weight bearing as tolerated     Shower on day dressing removed (No bath)

## 2023-10-12 NOTE — ANESTHESIA PROCEDURE NOTES
Intubation    Date/Time: 10/12/2023 11:54 AM    Performed by: Iván Sanchez CRNA  Authorized by: Connor Powers MD    Intubation:     Induction:  Intravenous    Intubated:  Postinduction    Mask Ventilation:  Easy mask    Attempts:  1    Attempted By:  CRNA    Method of Intubation:  Direct    Blade:  Trevor 4    Laryngeal View Grade: Grade IIA - cords partially seen      Difficult Airway Encountered?: No      Complications:  None    Airway Device:  Oral endotracheal tube    Airway Device Size:  7.5    Style/Cuff Inflation:  Cuffed    Inflation Amount (mL):  8    Tube secured:  19    Secured at:  The lips    Placement Verified By:  Capnometry    Complicating Factors:  None    Findings Post-Intubation:  BS equal bilateral and atraumatic/condition of teeth unchanged

## 2023-10-12 NOTE — DISCHARGE SUMMARY
[facility] - Orthopedic Periop Services  Discharge Note  Short Stay    Procedure(s) (LRB):  ARTHROSCOPY, SHOULDER (Left)  ACROMIOPLASTY, ARTHROSCOPIC (Left)  EXCISION, CLAVICLE, DISTAL (Left)  DEBRIDEMENT, SHOULDER, ARTHROSCOPIC (Left)  REPAIR, ROTATOR CUFF, ARTHROSCOPIC (Left)      OUTCOME: Patient tolerated treatment/procedure well without complication and is now ready for discharge.    DISPOSITION: Home or Self Care    FINAL DIAGNOSIS:  Shoulder impingement syndrome, left    FOLLOWUP: In clinic    DISCHARGE INSTRUCTIONS:    Discharge Procedure Orders   Diet general     Keep surgical extremity elevated     Ice to affected area   Order Comments: using barrier between ice and skin (specify duration&frequency)     Change dressing (specify)   Order Comments: Dressing change: one time per day beginning 72 hours post op.     Call MD for:  temperature >100.4     Call MD for:  persistent nausea and vomiting     Call MD for:  severe uncontrolled pain     Call MD for:  difficulty breathing, headache or visual disturbances     Call MD for:  redness, tenderness, or signs of infection (pain, swelling, redness, odor or green/yellow discharge around incision site)     Call MD for:  hives     Call MD for:  persistent dizziness or light-headedness     Call MD for:  extreme fatigue     Leave dressing on - Keep it clean, dry, and intact until clinic visit     Activity as tolerated     Weight bearing as tolerated     Shower on day dressing removed (No bath)        TIME SPENT ON DISCHARGE: 15 minutes

## 2023-10-12 NOTE — OR NURSING
1430 Rec'd pt to PACU asleep with no distress noted, respirations even and unlabored. VSS. Left shoulder dressing C/D/I with arm sling in place. Left radial pulse 2+, cap refill less than 3 seconds. No needs at this time. Will continue to monitor.     1507 Out of PACU. VSS. No signs of bleeding/distress noted.     1510 Pt to ASC 19 awake and alert. No signs of distress noted, respirations even and unlabored. Family at bedside. Bedside report given to MAGDA Dickerson RN. Left shoulder dressing C/D/I, arm sling in place. Left radial pulse 2+, cap refill less than 3 seconds, able to wiggle fingers on command. Denies pain/needs. /67, P 67, R 12, O2 95% RA.

## 2023-10-12 NOTE — OP NOTE
[facility] - Orthopedic Periop Services  Surgery Department  Operative Note    SUMMARY     Date of Procedure: 10/12/2023     Procedure: Procedure(s) (LRB):  ARTHROSCOPY, SHOULDER (Left)  ACROMIOPLASTY, ARTHROSCOPIC (Left)  EXCISION, CLAVICLE, DISTAL (Left)  DEBRIDEMENT, SHOULDER, ARTHROSCOPIC (Left)  REPAIR, ROTATOR CUFF, ARTHROSCOPIC (Left)     Surgeon(s) and Role:     * Pablo Ramirez MD - Primary    Assisting Surgeon:  None    Pre-Operative Diagnosis: Acute pain of left shoulder [M25.512]  Shoulder impingement syndrome, left [M75.42]    Post-Operative Diagnosis: Post-Op Diagnosis Codes:     * Acute pain of left shoulder [M25.512]     * Shoulder impingement syndrome, left [M75.42]    Anesthesia:  General/regional block    Technical Procedures Used:                          DEPARTMENT OF ORTHOPEDIC SURGERY                OPERATIVE REPORT     NAME:  Desire Worrell  MRN: [id number]     DATE OF SURGERY:  10/12/2023    PREOPERATIVE DIAGNOSIS: left shoulder internal derangement       POSTOPERATIVE DIAGNOSIS:  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  After adequate level of general anesthesia had been achieved (see anesthesia note) patients left shoulder was examined.  left shoulder normal contour.  There is full passive range of motion of the shoulder without instability signs.  left shoulder and upper extremity was scrubbed with Betadine and draped in sterile fashion.  The operation was begun by inflating the joint with sterile saline through a  posterior approach using an 18 gauge spinal needle.  Now a linear skin incision was made over the anterior aspect of the right shoulder for introduction of the blunt arthroscopy cannula.  Intraarticular positioning was confirmed with the arthroscopy camera. The anterior portal was established though an anterior incision made lateral to the coracoid process. The joint was entered through a trans-subscapularis muscle approach.  Inspection of glenohumeral joint revealed moderate degenerative changes involving the glenohumeral articular surfaces grade 2-3/4.  There was degenerative tearing of the anterior labrum.  This debrided with a shaver and contoured with the radiofrequency Wand.  The biceps anchor was intact.  There was large full-thickness retracted tear of the supraspinatus and infraspinatus tendons.  Joint was irrigated antibiotic solution and the arthroscopy equipment was withdrawn.  The blunt cannula was redirected from the posterior portal superiorly into the subacromial space.  Anterior and lateral portals were established.  A bursectomy was performed with the shaver.  The coracoacromial ligaments taken down with the radiofrequency Wand.  A Simi distal clavicle resection and acromioplasty was performed with a barrel bur.  Attention was turned to the rotator cuff tear.  The footprint on the greater tuberosity was abraded to punctate bleeding cortical bone with a shaver.  Rotator cuff tears were repaired with Arthrex SpeedBridge using FiberTape suture and bio anchors.  Good approximation stability was achieved.  Now, the subacromial space was irrigated with saline solution and arthroscope withdrawn.  The stab wounds were reapproximated with interrupted 4-0 suture.  The wounds dressed sterilely and arm sling applied.  The patient was taken to the recovery room in satisfactory condition.  ESTIMATED BLOOD LOSS:  10 ccs.  The patient received Ancef antibiotic intravenously prior to the procedure.      Pablo SANTIAGO  MD James     Description of the Findings of the Procedure:  Large retracted rotator cuff tear    Significant Surgical Tasks Conducted by the Assistant(s), if Applicable:     Complications: No    Estimated Blood Loss (EBL): * No values recorded between 10/12/2023 12:38 PM and 10/12/2023  2:27 PM *           Implants:   Implant Name Type Inv. Item Serial No.  Lot No. LRB No. Used Action   ANCHOR SWIVELOCK C KB FIBERTP - [id number]  ANCHOR SWIVELOCK C KB FIBERTP  [employer] [id number] Left 1 Implanted   ANCHOR SWIVELOCK C TIGERTAPE - [id number]  ANCHOR SWIVELOCK C TIGERTAPE  [employer] [id number] Left 1 Implanted   ANCHOR SWIVELOCK KNTLS BLUE #2 - [id number]  ANCHOR SWIVELOCK KNTLS BLUE #2  [employer] [id number] Left 1 Implanted   ANCHOR SWIVELOCK KNTLS BLUE #2 - [id number]  ANCHOR SWIVELOCK KNTLS BLUE #2  [employer] [id number] Left 1 Wasted   ANCHOR SUT BIOCOM 5.5X19.1MM - [id number]  ANCHOR SUT BIOCOM 5.5X19.1MM  [employer] [id number] Left 1 Implanted       Specimens:   Specimen (24h ago, onward)      None                    Condition: Good    Disposition: PACU - hemodynamically stable.    Attestation: I was present and scrubbed for the entire procedure.

## 2023-10-12 NOTE — ANESTHESIA PREPROCEDURE EVALUATION
10/12/2023  Desire Worrell is a 78 y.o., female.      Pre-op Assessment    I have reviewed the Patient Summary Reports.    I have reviewed the NPO Status.   I have reviewed the Medications.     Review of Systems         Anesthesia Plan  Type of Anesthesia, risks & benefits discussed:    Anesthesia Type: Gen ETT, Regional  Intra-op Monitoring Plan: Standard ASA Monitors  Post Op Pain Control Plan: multimodal analgesia  Induction:  IV  Informed Consent: Informed consent signed with the Patient and all parties understand the risks and agree with anesthesia plan.  All questions answered.   ASA Score: 3    Ready For Surgery From Anesthesia Perspective.     .  NPO greater than 8 hours  No anesthetic complications  Prednisone    Medical History   Thyroid disease Gastro-esophageal reflux disease without esophagitis   Bilateral lower extremity edema  DELMI  Intermittent hypoglycemia    Airway exam deferred (COVID precautions); adequate ROM at neck.    Plan is GETA plus interscalene

## 2023-10-12 NOTE — TRANSFER OF CARE
"Anesthesia Transfer of Care Note    Patient: Desire Worrell    Procedure(s) Performed: Procedure(s) (LRB):  ARTHROSCOPY, SHOULDER (Left)  ACROMIOPLASTY, ARTHROSCOPIC (Left)  EXCISION, CLAVICLE, DISTAL (Left)  DEBRIDEMENT, SHOULDER, ARTHROSCOPIC (Left)  REPAIR, ROTATOR CUFF, ARTHROSCOPIC (Left)    Patient location: PACU    Anesthesia Type: general    Transport from OR: Transported from OR on room air with adequate spontaneous ventilation    Post pain: adequate analgesia    Post assessment: no apparent anesthetic complications and tolerated procedure well    Post vital signs: stable    Level of consciousness: responds to stimulation    Nausea/Vomiting: no nausea/vomiting    Complications: none    Transfer of care protocol was followedComments: Report Given to PACU rn VSS      Last vitals:   Visit Vitals  BP (!) 157/74 (BP Location: Right arm)   Pulse 75   Temp 36.4 °C (97.6 °F) (Oral)   Resp (!) 0   Ht 4' 9" (1.448 m)   Wt 97.5 kg (215 lb)   SpO2 (!) 92%   Breastfeeding No   BMI 46.53 kg/m²     "

## 2023-10-12 NOTE — DISCHARGE INSTRUCTIONS
SEE TEACHING SHEETS FOR FURTHER DETAILS. PLACE OP SITES OVER WOUNDS BEFORE TAKING A SHOWER., PAT DRY AND LEAVE THEM ON UNTIL DR'S APPOINTMENT.

## 2023-10-16 NOTE — ANESTHESIA POSTPROCEDURE EVALUATION
Anesthesia Post Evaluation    Patient: Desire Worrell    Procedure(s) Performed: Procedure(s) (LRB):  ARTHROSCOPY, SHOULDER (Left)  ACROMIOPLASTY, ARTHROSCOPIC (Left)  EXCISION, CLAVICLE, DISTAL (Left)  DEBRIDEMENT, SHOULDER, ARTHROSCOPIC (Left)  REPAIR, ROTATOR CUFF, ARTHROSCOPIC (Left)    Final Anesthesia Type: general      Patient location during evaluation: PACU  Post-procedure vital signs: reviewed and stable  Pain management: adequate  Airway patency: patent  DELMI mitigation strategies: Multimodal analgesia  PONV status at discharge: No PONV  Anesthetic complications: no      Cardiovascular status: hemodynamically stable  Respiratory status: unassisted  Hydration status: euvolemic  Follow-up not needed.          Vitals Value Taken Time   /67 10/12/23 1515   Temp 36.4 °C (97.6 °F) 10/12/23 1434   Pulse 64 10/12/23 1517   Resp 14 10/12/23 1515   SpO2 94 % 10/12/23 1517   Vitals shown include unvalidated device data.      Event Time   Out of Recovery 15:07:00         Pain/Yanira Score: No data recorded

## 2023-10-19 ENCOUNTER — OFFICE VISIT (OUTPATIENT)
Dept: ORTHOPEDICS | Facility: CLINIC | Age: 78
End: 2023-10-19
Payer: MEDICARE

## 2023-10-19 VITALS
WEIGHT: 215 LBS | BODY MASS INDEX: 46.38 KG/M2 | DIASTOLIC BLOOD PRESSURE: 84 MMHG | HEART RATE: 70 BPM | HEIGHT: 57 IN | TEMPERATURE: 98 F | SYSTOLIC BLOOD PRESSURE: 132 MMHG | RESPIRATION RATE: 16 BRPM | OXYGEN SATURATION: 99 %

## 2023-10-19 DIAGNOSIS — Z98.890 STATUS POST LEFT ROTATOR CUFF REPAIR: Primary | ICD-10-CM

## 2023-10-19 PROCEDURE — 99024 POSTOP FOLLOW-UP VISIT: CPT | Mod: ,,, | Performed by: NURSE PRACTITIONER

## 2023-10-19 PROCEDURE — 99214 OFFICE O/P EST MOD 30 MIN: CPT | Mod: PBBFAC | Performed by: NURSE PRACTITIONER

## 2023-10-19 PROCEDURE — 99024 PR POST-OP FOLLOW-UP VISIT: ICD-10-PCS | Mod: ,,, | Performed by: NURSE PRACTITIONER

## 2023-10-19 NOTE — PATIENT INSTRUCTIONS
Safety guidelines and activity restrictions are discussed with the patient.  Reinforced the need to sleep with head of bed elevated or in her recliner.  Keep left shoulder immobilized at all times.  Instructed on range-of-motion exercises left elbow for extension and flexion and pronation supination.  Verbalized understanding.  Sutures removed Steri-Strips applied.  Return to orthopedic clinic 3 weeks follow-up Dr. Ramirez or sooner as needed.

## 2023-10-19 NOTE — PROGRESS NOTES
78-year-old female patient presents in wheelchair re-evaluation of her left shoulder.  She is known to orthopedic clinic having undergone left shoulder arthroscopy with arthroscopic acromioplasty, Simi distal clavicle resection, debridement in repair of her rotator cuff.  She states she is having some pain and discomfort.  States pain medication keeps her awake at night.  Does not need further pain medication at this time.      PE:  Physical exam left shoulder she has significant bruising in her left upper chest wall which to be expected.  Surgical sites covered with clear occlusive dressing.  Portal sites healing well without sign or symptom flexion.  Sutures are noted.  Left radial pulse 2/4.  Capillary refill all digits left hand less 3 seconds.      Impression:  1 week following left shoulder arthroscopic rotator cuff repair     Plan:  Safety guidelines and activity restrictions are discussed with the patient.  Reinforced the need to sleep with head of bed elevated or in her recliner.  Keep left shoulder immobilized at all times.  Instructed on range-of-motion exercises left elbow for extension and flexion and pronation supination.  Verbalized understanding.  Sutures removed Steri-Strips applied.  Return to orthopedic clinic 3 weeks follow-up Dr. Ramirez or sooner as needed.

## 2023-10-23 DIAGNOSIS — Z98.890 STATUS POST LEFT ROTATOR CUFF REPAIR: Primary | ICD-10-CM

## 2023-10-24 PROCEDURE — G0180 MD CERTIFICATION HHA PATIENT: HCPCS | Mod: ,,, | Performed by: ORTHOPAEDIC SURGERY

## 2023-10-24 PROCEDURE — G0180 PR HOME HEALTH MD CERTIFICATION: ICD-10-PCS | Mod: ,,, | Performed by: ORTHOPAEDIC SURGERY

## 2023-11-07 ENCOUNTER — EXTERNAL HOME HEALTH (OUTPATIENT)
Dept: HOME HEALTH SERVICES | Facility: HOSPITAL | Age: 78
End: 2023-11-07
Payer: MEDICARE

## 2023-11-08 ENCOUNTER — OFFICE VISIT (OUTPATIENT)
Dept: ORTHOPEDICS | Facility: CLINIC | Age: 78
End: 2023-11-08
Payer: MEDICARE

## 2023-11-08 DIAGNOSIS — Z98.890 STATUS POST LEFT ROTATOR CUFF REPAIR: Primary | ICD-10-CM

## 2023-11-08 PROCEDURE — 99213 OFFICE O/P EST LOW 20 MIN: CPT | Mod: PBBFAC | Performed by: NURSE PRACTITIONER

## 2023-11-08 PROCEDURE — 99024 PR POST-OP FOLLOW-UP VISIT: ICD-10-PCS | Mod: ,,, | Performed by: NURSE PRACTITIONER

## 2023-11-08 PROCEDURE — 99024 POSTOP FOLLOW-UP VISIT: CPT | Mod: ,,, | Performed by: NURSE PRACTITIONER

## 2023-11-08 RX ORDER — HYDROCODONE BITARTRATE AND ACETAMINOPHEN 5; 325 MG/1; MG/1
1 TABLET ORAL EVERY 8 HOURS PRN
Qty: 24 TABLET | Refills: 0 | Status: SHIPPED | OUTPATIENT
Start: 2023-11-08

## 2023-11-08 NOTE — PROGRESS NOTES
8-year-old female presents ambulatory orthopedic clinic re-evaluation of her left shoulder.  She is known to orthopedic clinic having undergone left shoulder arthroscopy with arthroscopic rotator cuff repair repair.  She does request refill of pain medication.  She states the pain is somewhat better but she does continue to have pain and discomfort.    PE:  Physical exam left shoulder shoulder has normal contour.  Passive range motion left shoulder is approximately 80° abduct shin.  Portal sites well healed without signs symptom of infection.  Left radial pulse 2/4.  Capillary refill all digits left hand less 3 seconds.      Impression:  4 weeks following left shoulder arthroscopic rotator cuff repair    Plan:  Safety guidelines activity restrictions are discussed with the patient.  [place]  reviewed.  Patient found.  Prescription Norco 5-325 mg 1 p.o. q.8 hours p.r.n. pain left shoulder number 24 with no refills sent [facility] in [facility] patient request.  She was given a prescription for outpatient physical therapy to begin in 4 weeks.  She will have assisted range of motion with no resistance or strength training 3 times a week x2 weeks.  Beginning today she will start Codman and pendulum exercises.  She will perform those 2 to 3 times a day x2 weeks.  We will have return orthopedic clinic in 4 weeks follow-up Dr. Ramirez or sooner as needed.

## 2023-11-08 NOTE — PATIENT INSTRUCTIONS
Safety guidelines activity restrictions are discussed with the patient.  [facility] reviewed.  Patient found.  Prescription Norco 5-325 mg 1 p.o. q.8 hours p.r.n. pain left shoulder number 24 with no refills [facility] in [facility] patient request.  She was given a prescription for outpatient physical therapy to begin in 4 weeks.  She will have assisted range of motion with no resistance or strength training 3 times a week x2 weeks.  Beginning today she will start Codman and pendulum exercises.  She will perform those 2 to 3 times a day x2 weeks.  We will have return orthopedic clinic in 4 weeks follow-up Dr. Ramirez or sooner as needed.

## 2023-11-29 ENCOUNTER — OFFICE VISIT (OUTPATIENT)
Dept: ORTHOPEDICS | Facility: CLINIC | Age: 78
End: 2023-11-29
Payer: MEDICARE

## 2023-11-29 DIAGNOSIS — Z98.890 STATUS POST LEFT ROTATOR CUFF REPAIR: Primary | ICD-10-CM

## 2023-11-29 PROCEDURE — 99024 POSTOP FOLLOW-UP VISIT: CPT | Mod: ,,, | Performed by: NURSE PRACTITIONER

## 2023-11-29 PROCEDURE — 99024 PR POST-OP FOLLOW-UP VISIT: ICD-10-PCS | Mod: ,,, | Performed by: NURSE PRACTITIONER

## 2023-11-29 PROCEDURE — 99213 OFFICE O/P EST LOW 20 MIN: CPT | Mod: PBBFAC | Performed by: NURSE PRACTITIONER

## 2023-11-29 NOTE — PROGRESS NOTES
78-year-old female presents ambulatory orthopedic re-evaluation left shoulder.  She is known to orthopedic clinic having undergone left shoulder arthroscopic rotator cuff repair 8 weeks ago.  She was last seen orthopedic clinic on 11/08/2023.  She was scheduled for physical therapy.  Due to holidays and other constraints she was unable to start therapy until yesterday.  She reports improvement pain symptoms.  Does not require further pain medication.      PE:  Physical exam left shoulder shoulder has normal contour.  Passive range motion left shoulder proximally degrees abduct shin.  Left radial pulse 2/4.  Capillary refill all digits left hand less than 3 seconds.      Impression:  8 weeks following left shoulder arthroscopy with arthroscopic rotator cuff repair     Plan:  Safety guidelines and activity restrictions are discussed with the patient.  She verbalizes under standing.  She will continue range-of-motion exercises of the elbow wrist forearm and hand while keeping shoulder immobilized.  She was given new orders for physical therapy to continue assisted/active range of motion of the left shoulder 3 times a week x2 weeks followed by active range of motion left shoulder 2 to 3 times a week x4 weeks.  No resistance or strength training.  We will have return orthopedic clinic in 3 weeks follow-up Dr. Ramirez re-evaluation or sooner as needed.

## 2023-11-29 NOTE — PATIENT INSTRUCTIONS
Safety guidelines and activity restrictions are discussed with the patient.  She verbalizes under standing.  She will continue range-of-motion exercises of the elbow wrist forearm and hand while keeping shoulder immobilized.  She was given new orders for physical therapy to continue assisted/active range of motion of the left shoulder 3 times a week x2 weeks followed by active range of motion left shoulder 2 to 3 times a week x4 weeks.  No resistance or strength training.  We will have return orthopedic clinic in 3 weeks follow-up Dr. Ramirez re-evaluation or sooner as needed.

## 2023-12-07 ENCOUNTER — HOSPITAL ENCOUNTER (OUTPATIENT)
Dept: RADIOLOGY | Facility: HOSPITAL | Age: 78
Discharge: HOME | End: 2023-12-07
Attending: NURSE PRACTITIONER
Payer: MEDICARE

## 2023-12-07 ENCOUNTER — OFFICE VISIT (OUTPATIENT)
Dept: ORTHOPEDICS | Facility: CLINIC | Age: 78
End: 2023-12-07
Payer: MEDICARE

## 2023-12-07 VITALS
WEIGHT: 250 LBS | OXYGEN SATURATION: 98 % | HEART RATE: 78 BPM | HEIGHT: 57 IN | BODY MASS INDEX: 53.94 KG/M2 | RESPIRATION RATE: 16 BRPM

## 2023-12-07 DIAGNOSIS — Z98.890 S/P LEFT ROTATOR CUFF REPAIR: ICD-10-CM

## 2023-12-07 DIAGNOSIS — M25.561 CHRONIC PAIN OF RIGHT KNEE: ICD-10-CM

## 2023-12-07 DIAGNOSIS — G89.29 CHRONIC PAIN OF RIGHT KNEE: Primary | ICD-10-CM

## 2023-12-07 DIAGNOSIS — M17.11 PRIMARY OSTEOARTHRITIS OF RIGHT KNEE: Primary | ICD-10-CM

## 2023-12-07 DIAGNOSIS — M25.561 CHRONIC PAIN OF RIGHT KNEE: Primary | ICD-10-CM

## 2023-12-07 DIAGNOSIS — G89.29 CHRONIC PAIN OF RIGHT KNEE: ICD-10-CM

## 2023-12-07 PROBLEM — M19.90 ARTHRITIS: Status: ACTIVE | Noted: 2020-03-10

## 2023-12-07 PROBLEM — E03.8 OTHER SPECIFIED HYPOTHYROIDISM: Status: ACTIVE | Noted: 2020-09-23

## 2023-12-07 PROBLEM — R01.1 SYSTOLIC MURMUR: Status: ACTIVE | Noted: 2022-09-28

## 2023-12-07 PROBLEM — I95.0 IDIOPATHIC HYPOTENSION: Status: ACTIVE | Noted: 2022-09-28

## 2023-12-07 PROBLEM — I47.9 PAROXYSMAL TACHYCARDIA: Status: ACTIVE | Noted: 2021-09-01

## 2023-12-07 PROBLEM — D18.01 ANGIOMA OF SKIN: Status: ACTIVE | Noted: 2022-09-17

## 2023-12-07 PROBLEM — N20.0 KIDNEY STONE: Status: ACTIVE | Noted: 2018-08-21

## 2023-12-07 PROBLEM — R00.1 BRADYCARDIA: Status: ACTIVE | Noted: 2021-01-13

## 2023-12-07 PROBLEM — L82.1 SEBORRHEIC KERATOSIS: Status: ACTIVE | Noted: 2022-09-17

## 2023-12-07 PROBLEM — L57.9 SKIN CHANGES DUE TO CHRONIC EXPOSURE TO NONIONIZING RADIATION: Status: ACTIVE | Noted: 2022-09-17

## 2023-12-07 PROBLEM — E55.9 VITAMIN D DEFICIENCY: Status: ACTIVE | Noted: 2021-07-30

## 2023-12-07 PROBLEM — G47.33 OSA ON CPAP: Status: ACTIVE | Noted: 2020-03-10

## 2023-12-07 PROCEDURE — 73562 X-RAY EXAM OF KNEE 3: CPT | Mod: 26,RT,, | Performed by: RADIOLOGY

## 2023-12-07 PROCEDURE — 99214 PR OFFICE/OUTPT VISIT, EST, LEVL IV, 30-39 MIN: ICD-10-PCS | Mod: S$PBB,24,25, | Performed by: NURSE PRACTITIONER

## 2023-12-07 PROCEDURE — 73562 XR KNEE AP LAT WITH SUNRISE RIGHT: ICD-10-PCS | Mod: 26,RT,, | Performed by: RADIOLOGY

## 2023-12-07 PROCEDURE — 99999PBSHW PR PBB SHADOW TECHNICAL ONLY FILED TO HB: Mod: PBBFAC,,,

## 2023-12-07 PROCEDURE — 20610 DRAIN/INJ JOINT/BURSA W/O US: CPT | Mod: PBBFAC | Performed by: NURSE PRACTITIONER

## 2023-12-07 PROCEDURE — 73562 X-RAY EXAM OF KNEE 3: CPT | Mod: TC,RT

## 2023-12-07 PROCEDURE — 99214 OFFICE O/P EST MOD 30 MIN: CPT | Mod: S$PBB,24,25, | Performed by: NURSE PRACTITIONER

## 2023-12-07 PROCEDURE — 20610 LARGE JOINT ASPIRATION/INJECTION: R KNEE: ICD-10-PCS | Mod: S$PBB,79,RT, | Performed by: NURSE PRACTITIONER

## 2023-12-07 PROCEDURE — 99214 OFFICE O/P EST MOD 30 MIN: CPT | Mod: PBBFAC,25 | Performed by: NURSE PRACTITIONER

## 2023-12-07 PROCEDURE — 99999PBSHW PR PBB SHADOW TECHNICAL ONLY FILED TO HB: ICD-10-PCS | Mod: PBBFAC,,,

## 2023-12-07 RX ORDER — FUROSEMIDE 20 MG/1
TABLET ORAL
COMMUNITY

## 2023-12-07 RX ORDER — TRIAMCINOLONE ACETONIDE 40 MG/ML
40 INJECTION, SUSPENSION INTRA-ARTICULAR; INTRAMUSCULAR
Status: DISCONTINUED | OUTPATIENT
Start: 2023-12-07 | End: 2023-12-07 | Stop reason: HOSPADM

## 2023-12-07 RX ORDER — CHLOPHEDIANOL HCL AND PYRILAMINE MALEATE 12.5; 12.5 MG/5ML; MG/5ML
SOLUTION ORAL
COMMUNITY
Start: 2023-06-21

## 2023-12-07 RX ORDER — HYDROCODONE BITARTRATE AND ACETAMINOPHEN 5; 325 MG/1; MG/1
1 TABLET ORAL EVERY 8 HOURS PRN
Qty: 21 TABLET | Refills: 0 | Status: SHIPPED | OUTPATIENT
Start: 2023-12-07

## 2023-12-07 RX ORDER — HYDROCODONE BITARTRATE AND ACETAMINOPHEN 10; 325 MG/1; MG/1
TABLET ORAL
COMMUNITY

## 2023-12-07 RX ORDER — BUPIVACAINE HYDROCHLORIDE 2.5 MG/ML
1 INJECTION, SOLUTION EPIDURAL; INFILTRATION; INTRACAUDAL
Status: DISCONTINUED | OUTPATIENT
Start: 2023-12-07 | End: 2023-12-07 | Stop reason: HOSPADM

## 2023-12-07 RX ADMIN — BUPIVACAINE HYDROCHLORIDE 1 ML: 2.5 INJECTION, SOLUTION EPIDURAL; INFILTRATION; INTRACAUDAL at 02:12

## 2023-12-07 RX ADMIN — TRIAMCINOLONE ACETONIDE 40 MG: 40 INJECTION, SUSPENSION INTRA-ARTICULAR; INTRAMUSCULAR at 02:12

## 2023-12-07 NOTE — PATIENT INSTRUCTIONS
Safety guidelines and activity restrictions discussed with the patient.  Verbalized understanding.  Right knee is prepped with Betadine.  Intra-articular triamcinolone 40 mg and 0.25% bupivacaine 1 cc instilled without difficulty.  She was also postop rotator cuff repair left.  She request refill of pain medication.  [facility] reviewed.  Patient found.  Prescription [place] [phone] p.o. q.8 hours number 24 with no refills sent to pharmacy of her choice.  Return to orthopedic clinic with Dr. Ramirez as scheduled or sooner as needed.

## 2023-12-07 NOTE — PROCEDURES
Large Joint Aspiration/Injection: R knee    Date/Time: 12/7/2023 2:00 PM    Performed by: Avtar Cassidy FNP  Authorized by: Avtar Cassidy FNP    Consent Done?:  Yes (Verbal)  Indications:  Arthritis and pain  Site marked: the procedure site was marked    Timeout: prior to procedure the correct patient, procedure, and site was verified    Local anesthesia used?: No      Details:  Needle Size:  25 G  Approach:  Lateral  Location:  Knee  Site:  R knee  Medications:  1 mL BUPivacaine (PF) 0.25% (2.5 mg/ml) 0.25 % (2.5 mg/mL); 40 mg triamcinolone acetonide 40 mg/mL  Patient tolerance:  Patient tolerated the procedure well with no immediate complications     Right knee prepped with Betadine

## 2023-12-07 NOTE — PROGRESS NOTES
78-year-old female presents to Orthopedic Clinic in wheelchair complaint of pain in her right knee.  She is known to orthopedic clinic having DJD of the right knee.  Denies injury trauma.  She denies fever, chills or any sinus symptom flexion.  States she was seen at an outlying clinic in told that they thought it was gout however, she was negative serum uric acid level.      X-ray: X-rays today of the right knee AP lateral and sunrise view shows moderately severe tricompartmental DJD.  No evidence acute fracture, dislocation pathologic bone.      PE:  She is in wheelchair this time.  Physical exam right knee skin is warm dry and intact.  Range motion right knee 0° extension further flexion to approximately 90°.  Flexion is inhibited by body habitus.  She is no tenderness to palpation on the lateral side of the knee.  There is tenderness to palpation medial side of the knee.  No intra-articular effusion appreciated clinically.      Impression:  Primary localized DJD knee-right     Plan:  Safety guidelines and activity restrictions discussed with the patient.  Verbalized understanding.  Right knee is prepped with Betadine.  Intra-articular triamcinolone 40 mg and 0.25% bupivacaine 1 cc instilled without difficulty.  She was also postop rotator cuff repair left.  She request refill of pain medication.  [facility] reviewed.  Patient found.  Prescription [place] [phone] p.o. q.8 hours number 24 with no refills sent to pharmacy of her choice.  Return to orthopedic clinic with Dr. Ramirez as scheduled or sooner as needed.

## 2023-12-13 ENCOUNTER — OFFICE VISIT (OUTPATIENT)
Dept: ORTHOPEDICS | Facility: CLINIC | Age: 78
End: 2023-12-13
Payer: MEDICARE

## 2023-12-13 DIAGNOSIS — M17.11 PRIMARY OSTEOARTHRITIS OF RIGHT KNEE: Primary | ICD-10-CM

## 2023-12-13 PROCEDURE — 99999PBSHW PR PBB SHADOW TECHNICAL ONLY FILED TO HB: ICD-10-PCS | Mod: PBBFAC,,,

## 2023-12-13 PROCEDURE — 99214 PR OFFICE/OUTPT VISIT, EST, LEVL IV, 30-39 MIN: ICD-10-PCS | Mod: S$PBB,24,25, | Performed by: ORTHOPAEDIC SURGERY

## 2023-12-13 PROCEDURE — 20610 PR DRAIN/INJECT LARGE JOINT/BURSA: ICD-10-PCS | Mod: S$PBB,79,RT, | Performed by: ORTHOPAEDIC SURGERY

## 2023-12-13 PROCEDURE — 20610 DRAIN/INJ JOINT/BURSA W/O US: CPT | Mod: PBBFAC | Performed by: ORTHOPAEDIC SURGERY

## 2023-12-13 PROCEDURE — 99214 OFFICE O/P EST MOD 30 MIN: CPT | Mod: S$PBB,24,25, | Performed by: ORTHOPAEDIC SURGERY

## 2023-12-13 PROCEDURE — 99999PBSHW PR PBB SHADOW TECHNICAL ONLY FILED TO HB: Mod: PBBFAC,,,

## 2023-12-13 PROCEDURE — 20610 DRAIN/INJ JOINT/BURSA W/O US: CPT | Mod: S$PBB,79,RT, | Performed by: ORTHOPAEDIC SURGERY

## 2023-12-13 PROCEDURE — 99213 OFFICE O/P EST LOW 20 MIN: CPT | Mod: PBBFAC,25 | Performed by: ORTHOPAEDIC SURGERY

## 2023-12-13 RX ADMIN — Medication: at 02:12

## 2023-12-13 NOTE — PROGRESS NOTES
CLINIC NOTE       No chief complaint on file.       Desire Worrell is a 78 y.o. female seen today for recheck of right knee and left shoulder.  She underwent left knee arthroscopy with subacromial decompression 10/12/2023.  She did not have significant rotator cuff tearing.  In the interim she has had pain associated with DJD of the right knee.  In the past she has undergone a left TKR.  She was hoping to avoid right TKR.  She had an intra-articular steroid injection of the right knee given by [facility]tar DUENAS on 12/07/2023.  She did not derive any significant improvement.  Today we have discussed the option for viscosupplementation injection and literature was given.  She wished to proceed.  The right knee was prepped with Betadine intra-articular viscosupplementation injection performed with Monovisc 4 cc without difficulty.      Past Medical History:   Diagnosis Date    Gastro-esophageal reflux disease without esophagitis     Thyroid disease      No family history on file.  Current Outpatient Medications on File Prior to Visit   Medication Sig Dispense Refill    furosemide (LASIX) 20 MG tablet Take 20 mg by mouth.      furosemide (LASIX) 20 MG tablet 1 tablet Orally Once a day for 30 day(s)      HYDROcodone-acetaminophen (NORCO)  mg per tablet 1 tablet as needed Orally every 6 hrs      HYDROcodone-acetaminophen (NORCO) 5-325 mg per tablet Take 1 tablet by mouth every 8 (eight) hours as needed for Pain. 24 tablet 0    HYDROcodone-acetaminophen (NORCO) 5-325 mg per tablet Take 1 tablet by mouth every 8 (eight) hours as needed for Pain. 21 tablet 0    levothyroxine (SYNTHROID) 75 MCG tablet Take 75 mcg by mouth.      NINJACOF 12.5-12.5 mg/5 mL Liqd take 1 - 2 teaspoon by oral route  every 6 -8 hours      omeprazole-sodium bicarbonate 20-1.1 mg-gram Cap Take 20 mg by mouth.      potassium chloride (KLOR-CON) 10 MEQ TbSR Take 10 mEq by mouth once daily.       No current facility-administered medications  on file prior to visit.       ROS     There were no vitals filed for this visit.    Past Surgical History:   Procedure Laterality Date    ARTHROSCOPIC ACROMIOPLASTY OF SHOULDER Left 10/12/2023    Procedure: ACROMIOPLASTY, ARTHROSCOPIC;  Surgeon: Pablo Ramirez MD;  Location: [facility] OR;  Service: Orthopedics;  Laterality: Left;    ARTHROSCOPIC DEBRIDEMENT OF SHOULDER Left 10/12/2023    Procedure: DEBRIDEMENT, SHOULDER, ARTHROSCOPIC;  Surgeon: Pablo Ramirez MD;  Location: [facility]THO OR;  Service: Orthopedics;  Laterality: Left;    ARTHROSCOPIC REPAIR OF ROTATOR CUFF OF SHOULDER Left 10/12/2023    Procedure: REPAIR, ROTATOR CUFF, ARTHROSCOPIC;  Surgeon: Pablo Ramirez MD;  Location: [facility]TH[facility] OR;  Service: Orthopedics;  Laterality: Left;    DISTAL CLAVICLE EXCISION Left 10/12/2023    Procedure: EXCISION, CLAVICLE, DISTAL;  Surgeon: Pablo Ramirez MD;  Location: [facility] OR;  Service: Orthopedics;  Laterality: Left;    HYSTERECTOMY      SHOULDER ARTHROSCOPY Left 10/12/2023    Procedure: ARTHROSCOPY, SHOULDER;  Surgeon: Pablo Ramirez MD;  Location: [facility] OR;  Service: Orthopedics;  Laterality: Left;        Review of patient's allergies indicates:   Allergen Reactions    Codeine     Prednisone Other (See Comments)     UNKNOWN  C/O Weakness          Ortho Exam     Radiographic Examination:    Technique:    Findings:    Impression:   See Above    Assessment and Plan  Patient Active Problem List    Diagnosis Date Noted    Status post left rotator cuff repair 10/19/2023    Acute pain of left shoulder 09/25/2023    Shoulder impingement syndrome, left 09/25/2023    Primary osteoarthritis of right knee 06/14/2023    Idiopathic hypotension 09/28/2022    Systolic murmur 09/28/2022    Angioma of skin 09/17/2022    Seborrheic keratosis 09/17/2022    Skin changes due to chronic exposure to nonionizing radiation 09/17/2022    Paroxysmal tachycardia 09/01/2021     Vitamin D deficiency 07/30/2021    Plantar fasciitis of right foot 06/02/2021    Rotator cuff arthropathy, right 04/19/2021    Bradycardia 01/13/2021    Other specified hypothyroidism 09/23/2020    Arthritis 03/10/2020    DELMI on CPAP 03/10/2020    Kidney stone 08/21/2018    Headache 04/12/2016    Hypothyroidism 10/13/2015          Pablo Ramirez M.D.

## 2023-12-15 ENCOUNTER — TELEPHONE (OUTPATIENT)
Dept: ORTHOPEDICS | Facility: CLINIC | Age: 78
End: 2023-12-15
Payer: MEDICARE

## 2023-12-15 NOTE — TELEPHONE ENCOUNTER
Called and spoke with her she states today was better than yesterday. Discussed it should get better each day. She will call back Monday if needed.----- Message from Kathy Soriano sent at 12/14/2023  3:31 PM CST -----  Got an injection yesterday and is hurting more today. Call back # [phone]

## 2023-12-27 ENCOUNTER — OFFICE VISIT (OUTPATIENT)
Dept: ORTHOPEDICS | Facility: CLINIC | Age: 78
End: 2023-12-27
Payer: MEDICARE

## 2023-12-27 DIAGNOSIS — M17.11 PRIMARY OSTEOARTHRITIS OF RIGHT KNEE: Primary | ICD-10-CM

## 2023-12-27 PROCEDURE — 99213 PR OFFICE/OUTPT VISIT, EST, LEVL III, 20-29 MIN: ICD-10-PCS | Mod: S$PBB,24,, | Performed by: ORTHOPAEDIC SURGERY

## 2023-12-27 PROCEDURE — 99213 OFFICE O/P EST LOW 20 MIN: CPT | Mod: S$PBB,24,, | Performed by: ORTHOPAEDIC SURGERY

## 2023-12-27 PROCEDURE — 99213 OFFICE O/P EST LOW 20 MIN: CPT | Mod: PBBFAC | Performed by: ORTHOPAEDIC SURGERY

## 2023-12-27 NOTE — PROGRESS NOTES
CLINIC NOTE       Chief Complaint   Patient presents with    Left Shoulder - Post-op Evaluation        Desire Worrell is a 78 y.o. female seen today for recheck of her right knee.  And left shoulder.  She underwent arthroscopic subacromial decompression 10/12/2023.  She did not have significant rotator cuff tearing.  She was doing well aspect her left shoulder at this time.  She has moderate DJD involving her right knee.  She was previously underwent left total knee replacement arthroplasty.  X-rays of the right knee have shown early to moderate joint space narrowing.  She had been treated with a corticosteroid injection that she found ineffective.  She underwent viscosupplementation injection with Monovisc 12/13/2023.  She was not derive any significant improvement thus far.  She continues to ambulate with a quad cane.  Discussed time lag that could occur with viscosupplementation injection potentially 6-9 weeks.  X-rays did not suggest she was candidate for total knee replacement arthroplasty and she herself is not interested in a major procedure.  She was advised as to possibility of [facility] management but she is not interested in that option presently either.    Past Medical History:   Diagnosis Date    Gastro-esophageal reflux disease without esophagitis     Thyroid disease      No family history on file.  Current Outpatient Medications on File Prior to Visit   Medication Sig Dispense Refill    furosemide (LASIX) 20 MG tablet Take 20 mg by mouth.      furosemide (LASIX) 20 MG tablet 1 tablet Orally Once a day for 30 day(s)      HYDROcodone-acetaminophen (NORCO)  mg per tablet 1 tablet as needed Orally every 6 hrs      HYDROcodone-acetaminophen (NORCO) 5-325 mg per tablet Take 1 tablet by mouth every 8 (eight) hours as needed for Pain. 24 tablet 0    HYDROcodone-acetaminophen (NORCO) 5-325 mg per tablet Take 1 tablet by mouth every 8 (eight) hours as needed for Pain. 21 tablet 0    levothyroxine  (SYNTHROID) 75 MCG tablet Take 75 mcg by mouth.      NINJACOF 12.5-12.5 mg/5 mL Liqd take 1 - 2 teaspoon by oral route  every 6 -8 hours      omeprazole-sodium bicarbonate 20-1.1 mg-gram Cap Take 20 mg by mouth.      potassium chloride (KLOR-CON) 10 MEQ TbSR Take 10 mEq by mouth once daily.       No current facility-administered medications on file prior to visit.       ROS     There were no vitals filed for this visit.    Past Surgical History:   Procedure Laterality Date    ARTHROSCOPIC ACROMIOPLASTY OF SHOULDER Left 10/12/2023    Procedure: ACROMIOPLASTY, ARTHROSCOPIC;  Surgeon: Pablo Ramirez MD;  Location: [facility] OR;  Service: Orthopedics;  Laterality: Left;    ARTHROSCOPIC DEBRIDEMENT OF SHOULDER Left 10/12/2023    Procedure: DEBRIDEMENT, SHOULDER, ARTHROSCOPIC;  Surgeon: Pablo Ramirez MD;  Location: [facility] OR;  Service: Orthopedics;  Laterality: Left;    ARTHROSCOPIC REPAIR OF ROTATOR CUFF OF SHOULDER Left 10/12/2023    Procedure: REPAIR, ROTATOR CUFF, ARTHROSCOPIC;  Surgeon: Pablo Ramirez MD;  Location: [facility] OR;  Service: Orthopedics;  Laterality: Left;    DISTAL CLAVICLE EXCISION Left 10/12/2023    Procedure: EXCISION, CLAVICLE, DISTAL;  Surgeon: Pablo Ramirez MD;  Location: [facility] OR;  Service: Orthopedics;  Laterality: Left;    HYSTERECTOMY      SHOULDER ARTHROSCOPY Left 10/12/2023    Procedure: ARTHROSCOPY, SHOULDER;  Surgeon: Pablo Ramirez MD;  Location: [facility] OR;  Service: Orthopedics;  Laterality: Left;        Review of patient's allergies indicates:   Allergen Reactions    Codeine     Prednisone Other (See Comments)     UNKNOWN  C/O Weakness          Ortho Exam     Radiographic Examination:    Technique:    Findings:    Impression:   See Above    Assessment and Plan  Patient Active Problem List    Diagnosis Date Noted    Status post left rotator cuff repair 10/19/2023    Acute pain of left shoulder 09/25/2023     Shoulder impingement syndrome, left 09/25/2023    Primary osteoarthritis of right knee 06/14/2023    Idiopathic hypotension 09/28/2022    Systolic murmur 09/28/2022    Angioma of skin 09/17/2022    Seborrheic keratosis 09/17/2022    Skin changes due to chronic exposure to nonionizing radiation 09/17/2022    Paroxysmal tachycardia 09/01/2021    Vitamin D deficiency 07/30/2021    Plantar fasciitis of right foot 06/02/2021    Rotator cuff arthropathy, right 04/19/2021    Bradycardia 01/13/2021    Other specified hypothyroidism 09/23/2020    Arthritis 03/10/2020    DELMI on CPAP 03/10/2020    Kidney stone 08/21/2018    Headache 04/12/2016    Hypothyroidism 10/13/2015          Pablo Ramirez M.D.

## (undated) DEVICE — GLOVE 8 PROTEXIS PI BLUE

## (undated) DEVICE — GOWN IMPERVIOUS BLUE XXL

## (undated) DEVICE — APPLICATOR CHLORAPREP ORN 26ML

## (undated) DEVICE — KIT SHLDR POMIERSKIWATSON RUSH

## (undated) DEVICE — DRAPE INCISE IOBAN 2 13X13IN

## (undated) DEVICE — Device

## (undated) DEVICE — SUT FIBERLINK TAPE BLU WHT 1.3

## (undated) DEVICE — DEVICE SUCTION H20 BROOM 12FT

## (undated) DEVICE — NDL SCORPIAN SUTURE PASSER

## (undated) DEVICE — BUR FORMULA BRL 12 FLUTE 5.5MM

## (undated) DEVICE — CANISTER SUCTION MEDI-VAC 12L

## (undated) DEVICE — GLOVE BIOGEL SKINSENSE PI 7.5

## (undated) DEVICE — SHAVER TOMCAT 4.0

## (undated) DEVICE — SYR 10CC LUER LOCK

## (undated) DEVICE — PROBE SERFAS ENERGY 90S CRSE

## (undated) DEVICE — TUBING CROSSFLOW INFLOW CASS

## (undated) DEVICE — SPONGE COTTON TRAY 4X4IN

## (undated) DEVICE — SOL NACL IRR 3000ML

## (undated) DEVICE — SLING ARM LARGE FOAM STRAP

## (undated) DEVICE — CANNULA ARTHRO 8.25MM X 7CM